# Patient Record
Sex: FEMALE | Race: WHITE | Employment: PART TIME | URBAN - METROPOLITAN AREA
[De-identification: names, ages, dates, MRNs, and addresses within clinical notes are randomized per-mention and may not be internally consistent; named-entity substitution may affect disease eponyms.]

---

## 2020-11-04 ENCOUNTER — OFFICE VISIT (OUTPATIENT)
Dept: SURGERY | Age: 40
End: 2020-11-04
Payer: OTHER GOVERNMENT

## 2020-11-04 VITALS
TEMPERATURE: 98.6 F | DIASTOLIC BLOOD PRESSURE: 84 MMHG | HEART RATE: 77 BPM | WEIGHT: 160 LBS | SYSTOLIC BLOOD PRESSURE: 124 MMHG | RESPIRATION RATE: 18 BRPM | HEIGHT: 61 IN | OXYGEN SATURATION: 99 % | BODY MASS INDEX: 30.21 KG/M2

## 2020-11-04 DIAGNOSIS — E66.01 MORBID OBESITY (HCC): Primary | ICD-10-CM

## 2020-11-04 DIAGNOSIS — Z98.84 S/P GASTRIC BYPASS: ICD-10-CM

## 2020-11-04 PROCEDURE — 99203 OFFICE O/P NEW LOW 30 MIN: CPT | Performed by: NURSE PRACTITIONER

## 2020-11-04 RX ORDER — TRAZODONE HYDROCHLORIDE 50 MG/1
75 TABLET ORAL
COMMUNITY

## 2020-11-04 RX ORDER — TOPIRAMATE 100 MG/1
TABLET, FILM COATED ORAL 2 TIMES DAILY
COMMUNITY

## 2020-11-04 RX ORDER — INSULIN ASPART 100 [IU]/ML
INJECTION, SUSPENSION SUBCUTANEOUS
COMMUNITY
End: 2020-11-28

## 2020-11-04 RX ORDER — FLUOXETINE 20 MG/1
20 TABLET ORAL DAILY
COMMUNITY

## 2020-11-04 RX ORDER — VITAMIN B COMPLEX
2500 TABLET ORAL DAILY
COMMUNITY
End: 2021-01-29

## 2020-11-04 RX ORDER — ASPIRIN 325 MG
TABLET, DELAYED RELEASE (ENTERIC COATED) ORAL
COMMUNITY

## 2020-11-04 NOTE — PROGRESS NOTES
1. Have you been to the ER, urgent care clinic since your last visit? Hospitalized since your last visit? No    2. Have you seen or consulted any other health care providers outside of the [address] [place] [address] since your last visit? Include any pap smears or colon screening.  No

## 2020-11-04 NOTE — PROGRESS NOTES
HISTORY OF PRESENT ILLNESS  Logan Nunn is a 38 y.o. female with previous Gastric bypass surgery with Dr. Kev Ricci complicated by multiple strictures, dilation and \"candy cane syndrome. \"   She start weight was 283 lbs. Her lowest weight was 150 lbs. She ranges 155-160 lbs. Body mass index is 30.23 kg/m². Durga Valencia no nausea and no vomiting . Denies Acid reflux/heartburn. . Drinking  64 ounces of water daily. Tries to focus on protein intake daily. Durga Hires +BM's. Pt is walks for exercise. She takes a probiotic because \"everything goes right through me. \" She is taking gummy vitamins because any other vitamins. caused diarrhea. Her blood sugars have been high. She is on a insulin pump. She is having surgery on 11/18/2020 and attributes her higher sugars to pain. Dietary recall -    Breakfast- coffee  Lunch- salad with tuna  Dinner- chicken, steak and vegetables    She is snacking between meals; variety but not much. She has recently started adding a shake. .          Ms. Bacon has a reminder for a \"due or due soon\" health maintenance. I have asked that she contact her primary care provider for follow-up on this health maintenance. COMORBIDITY     SLEEP APNEA                 NO        GERD  (req.meds)            NO  HYPERLIPIDEMIA            NO  HYPERTENSION              NO         DIABETES                         YES           Current Outpatient Medications:     insulin aspart protamine/insulin aspart (NovoLOG Mix 70-30 U-100 Insuln) 100 unit/mL (70-30) injection, by SubCUTAneous route., Disp: , Rfl:     traZODone (DESYREL) 50 mg tablet, Take  by mouth nightly., Disp: , Rfl:     FLUoxetine (PROzac) 20 mg tablet, Take 20 mg by mouth daily. , Disp: , Rfl:     topiramate (Topamax) 100 mg tablet, Take  by mouth two (2) times a day., Disp: , Rfl:     cyanocobalamin (Vitamin B-12) 2,500 mcg sublingual tablet, Take 2,500 mcg by mouth daily. , Disp: , Rfl:       Visit Vitals  /84 (BP 1 Location: Right arm, BP Patient Position: Sitting)   Pulse 77   Temp 98.6 °F (37 °C) (Oral)   Resp 18   Wt 160 lb (72.6 kg)     HPI    Review of Systems   Respiratory: Negative for shortness of breath. Cardiovascular: Negative for chest pain. Gastrointestinal: Negative for abdominal pain, heartburn, nausea and vomiting. Neurological: Positive for headaches (migraines). Negative for dizziness. Physical Exam  Constitutional:       Appearance: She is well-developed. Cardiovascular:      Rate and Rhythm: Normal rate and regular rhythm. Heart sounds: No murmur. No friction rub. No gallop. Pulmonary:      Effort: Pulmonary effort is normal.      Breath sounds: Normal breath sounds. Abdominal:      General: Bowel sounds are normal. There is no distension. Palpations: Abdomen is soft. Tenderness: There is no abdominal tenderness. Comments: No masses or hernias noted. Skin:     General: Skin is warm and dry. Neurological:      Mental Status: She is alert and oriented to person, place, and time. ASSESSMENT and PLAN  Kelly Cuevas is a 38 y.o. female with previous Gastric bypass surgery with Dr. Milo Terrazas complicated by multiple strictures, dilation and \"candy cane syndrome. \"   She start weight was 283 lbs. Her lowest weight was 150 lbs. She ranges 155-160 lbs. Body mass index is 30.23 kg/m². Iqra Ravenel no nausea and no vomiting . Denies Acid reflux/heartburn. . Drinking  64 ounces of water daily. Tries to focus on protein intake daily. Iqra Ravenel +BM's. Pt is walks for exercise. She takes a probiotic because \"everything goes right through me. \" She is taking gummy vitamins because any other vitamins. New bariatric book given. We dicussed vitamins especially the need for Iron. Possibly add Vitron C. Meet with RD  Follow up in 3 weeks. Advised patient regard to diet that is high-protein, low-fat, low-sugar, limited carbohydrates. Strive for 50-60 grams of protein daily. If having a snack, foods that are protein or fiber rich. . No eating/drinking together, chew foods well, and portion control. Measure meals. Discussed snacking behavior and to [facility] pay attention to behavioral factor and habits. Drink at least 40-64 ounces of water or non-calorie/non-carbonated beverages daily. Continue vitamin regiment daily. Exercise at least 3 days a week with cardiovascular and strength training. Patient to follow up in 3 months. Advised to call office if any questions/concerns. 30 Minutes spent face to face with patient, >50 % of time spent counseling.

## 2020-11-05 NOTE — PATIENT INSTRUCTIONS
Eating Healthy Foods: Care Instructions Your Care Instructions Eating healthy foods can help lower your risk for disease. Healthy food gives you energy and keeps your heart strong, your brain active, your muscles working, and your bones strong. A healthy diet includes a variety of foods from the basic food groups: grains, vegetables, fruits, milk and milk products, and meat and beans. Some people may eat more of their favorite foods from only one food group and, as a result, miss getting the nutrients they need. So, it is important to pay attention not only to what you eat but also to what you are missing from your diet. You can eat a healthy, balanced diet by making a few small changes. Follow-up care is a key part of your treatment and safety. Be sure to make and go to all appointments, and call your doctor if you are having problems. It's also a good idea to know your test results and keep a list of the medicines you take. How can you care for yourself at home? Look at what you eat · Keep a food diary for a week or two and record everything you eat or drink. Track the number of servings you eat from each food group. · For a balanced diet every day, eat a variety of: 
? 6 or more ounce-equivalents of grains, such as cereals, breads, crackers, rice, or pasta, every day. An ounce-equivalent is 1 slice of bread, 1 cup of ready-to-eat cereal, or ½ cup of cooked rice, cooked pasta, or cooked cereal. 
? 2½ cups of vegetables, especially: § Dark-green vegetables such as broccoli and spinach. § Orange vegetables such as carrots and sweet potatoes. § Dry beans (such as hanks and kidney beans) and peas (such as lentils). ? 2 cups of fresh, frozen, or canned fruit. A small apple or 1 banana or orange equals 1 cup. ? 3 cups of nonfat or low-fat milk, yogurt, or other milk products.  
? 5½ ounces of meat and beans, such as chicken, fish, lean meat, beans, nuts, and seeds. One egg, 1 tablespoon of peanut butter, ½ ounce nuts or seeds, or ¼ cup of cooked beans equals 1 ounce of meat. · Learn how to read food labels for serving sizes and ingredients. Fast-food and convenience-food meals often contain few or no fruits or vegetables. Make sure you eat some fruits and vegetables to make the meal more nutritious. · Look at your food diary. For each food group, add up what you have eaten and then divide the total by the number of days. This will give you an idea of how much you are eating from each food group. See if you can find some ways to change your diet to make it more healthy. Start small · Do not try to make dramatic changes to your diet all at once. You might feel that you are missing out on your favorite foods and then be more likely to fail. · Start slowly, and gradually change your habits. Try some of the following: ? Use whole wheat bread instead of white bread. ? Use nonfat or low-fat milk instead of whole milk. ? Eat brown rice instead of white rice, and eat whole wheat pasta instead of white-flour pasta. ? Try low-fat cheeses and low-fat yogurt. ? Add more fruits and vegetables to meals and have them for snacks. ? Add lettuce, tomato, cucumber, and onion to sandwiches. ? Add fruit to yogurt and cereal. 
Enjoy food · You can still eat your favorite foods. You just may need to eat less of them. If your favorite foods are high in fat, salt, and sugar, limit how often you eat them, but do not cut them out entirely. · Eat a wide variety of foods. Make healthy choices when eating out · The type of restaurant you choose can help you make healthy choices. Even fast-food chains are now offering more low-fat or healthier choices on the menu. · Choose smaller portions, or take half of your meal home. · When eating out, try: ? A veggie pizza with a whole wheat crust or grilled chicken (instead of sausage or pepperoni). ? Pasta with roasted vegetables, grilled chicken, or marinara sauce instead of cream sauce. ? A vegetable wrap or grilled chicken wrap. ? Broiled or poached food instead of fried or breaded items. Make healthy choices easy · Buy packaged, prewashed, ready-to-eat fresh vegetables and fruits, such as baby carrots, salad mixes, and chopped or shredded broccoli and cauliflower. · Buy packaged, presliced fruits, such as melon or pineapple. · Choose 100% fruit or vegetable juice instead of soda. Limit juice intake to 4 to 6 oz (½ to ¾ cup) a day. · Blend low-fat yogurt, fruit juice, and canned or frozen fruit to make a smoothie for breakfast or a snack. Where can you learn more? Go to http://www.verdugo.com/ Enter T756 in the search box to learn more about \"Eating Healthy Foods: Care Instructions. \" Current as of: August 22, 2019               Content Version: 12.6 © 6263-7385 [employer]. Care instructions adapted under license by [employer] (which disclaims liability or warranty for this information). If you have questions about a medical condition or this instruction, always ask your healthcare professional. [address] any warranty or liability for your use of this information.

## 2020-11-26 ENCOUNTER — APPOINTMENT (OUTPATIENT)
Dept: GENERAL RADIOLOGY | Age: 40
DRG: 638 | End: 2020-11-26
Attending: INTERNAL MEDICINE
Payer: OTHER GOVERNMENT

## 2020-11-26 ENCOUNTER — APPOINTMENT (OUTPATIENT)
Dept: CT IMAGING | Age: 40
DRG: 638 | End: 2020-11-26
Attending: FAMILY MEDICINE
Payer: OTHER GOVERNMENT

## 2020-11-26 ENCOUNTER — HOSPITAL ENCOUNTER (INPATIENT)
Age: 40
LOS: 1 days | Discharge: HOME | DRG: 638 | End: 2020-11-28
Attending: EMERGENCY MEDICINE | Admitting: INTERNAL MEDICINE
Payer: OTHER GOVERNMENT

## 2020-11-26 ENCOUNTER — APPOINTMENT (OUTPATIENT)
Dept: GENERAL RADIOLOGY | Age: 40
DRG: 638 | End: 2020-11-26
Attending: EMERGENCY MEDICINE
Payer: OTHER GOVERNMENT

## 2020-11-26 DIAGNOSIS — E10.10 DIABETIC KETOACIDOSIS WITHOUT COMA ASSOCIATED WITH TYPE 1 DIABETES MELLITUS (HCC): Primary | ICD-10-CM

## 2020-11-26 PROBLEM — F41.9 ANXIETY: Chronic | Status: ACTIVE | Noted: 2020-11-26

## 2020-11-26 PROBLEM — G43.909 MIGRAINE: Chronic | Status: ACTIVE | Noted: 2020-11-26

## 2020-11-26 PROBLEM — E10.9 DM (DIABETES MELLITUS), TYPE 1 (HCC): Chronic | Status: ACTIVE | Noted: 2020-11-26

## 2020-11-26 PROBLEM — E11.10 DKA (DIABETIC KETOACIDOSES): Status: ACTIVE | Noted: 2020-11-26

## 2020-11-26 PROBLEM — E55.9 VITAMIN D DEFICIENCY: Chronic | Status: ACTIVE | Noted: 2020-11-26

## 2020-11-26 LAB
ALBUMIN SERPL-MCNC: 4 G/DL (ref 3.5–5)
ALBUMIN/GLOB SERPL: 1 {RATIO} (ref 1.1–2.2)
ALP SERPL-CCNC: 132 U/L (ref 45–117)
ALT SERPL-CCNC: 42 U/L (ref 12–78)
ANION GAP SERPL CALC-SCNC: 15 MMOL/L (ref 5–15)
ANION GAP SERPL CALC-SCNC: 16 MMOL/L (ref 5–15)
ANION GAP SERPL CALC-SCNC: 17 MMOL/L (ref 5–15)
ANION GAP SERPL CALC-SCNC: 9 MMOL/L (ref 5–15)
APPEARANCE UR: CLEAR
AST SERPL W P-5'-P-CCNC: 31 U/L (ref 15–37)
BACTERIA URNS QL MICRO: NEGATIVE /HPF
BASE DEFICIT BLDV-SCNC: 19.2 MMOL/L (ref 0–2)
BASOPHILS NFR BLD: 0 % (ref 0–1)
BDY SITE: ABNORMAL
BILIRUB SERPL-MCNC: 0.6 MG/DL (ref 0.2–1)
BILIRUB UR QL: NEGATIVE
BUN SERPL-MCNC: 10 MG/DL (ref 6–20)
BUN SERPL-MCNC: 11 MG/DL (ref 6–20)
BUN SERPL-MCNC: 11 MG/DL (ref 6–20)
BUN SERPL-MCNC: 13 MG/DL (ref 6–20)
BUN/CREAT SERPL: 14 (ref 12–20)
BUN/CREAT SERPL: 14 (ref 12–20)
BUN/CREAT SERPL: 16 (ref 12–20)
BUN/CREAT SERPL: 18 (ref 12–20)
CA-I BLD-MCNC: 8 MG/DL (ref 8.5–10.1)
CA-I BLD-MCNC: 8.1 MG/DL (ref 8.5–10.1)
CA-I BLD-MCNC: 8.6 MG/DL (ref 8.5–10.1)
CA-I BLD-MCNC: 8.9 MG/DL (ref 8.5–10.1)
CHLORIDE SERPL-SCNC: 105 MMOL/L (ref 97–108)
CHLORIDE SERPL-SCNC: 114 MMOL/L (ref 97–108)
CHLORIDE SERPL-SCNC: 115 MMOL/L (ref 97–108)
CHLORIDE SERPL-SCNC: 115 MMOL/L (ref 97–108)
CO2 SERPL-SCNC: 10 MMOL/L (ref 21–32)
CO2 SERPL-SCNC: 13 MMOL/L (ref 21–32)
CO2 SERPL-SCNC: 15 MMOL/L (ref 21–32)
CO2 SERPL-SCNC: 9 MMOL/L (ref 21–32)
COLOR UR: ABNORMAL
CREAT SERPL-MCNC: 0.62 MG/DL (ref 0.55–1.02)
CREAT SERPL-MCNC: 0.64 MG/DL (ref 0.55–1.02)
CREAT SERPL-MCNC: 0.79 MG/DL (ref 0.55–1.02)
CREAT SERPL-MCNC: 0.93 MG/DL (ref 0.55–1.02)
D DIMER PPP FEU-MCNC: 1.53 UG/ML(FEU)
DIFFERENTIAL METHOD BLD: ABNORMAL
EOSINOPHIL # BLD: 0 K/UL (ref 0–0.4)
EOSINOPHIL NFR BLD: 0 % (ref 0–7)
ERYTHROCYTE [DISTWIDTH] IN BLOOD BY AUTOMATED COUNT: 15.5 % (ref 11.5–14.5)
FIO2 ON VENT: 21 %
GLOBULIN SER CALC-MCNC: 4.1 G/DL (ref 2–4)
GLUCOSE BLD STRIP.AUTO-MCNC: 111 MG/DL (ref 65–100)
GLUCOSE BLD STRIP.AUTO-MCNC: 126 MG/DL (ref 65–100)
GLUCOSE BLD STRIP.AUTO-MCNC: 344 MG/DL (ref 65–100)
GLUCOSE BLD STRIP.AUTO-MCNC: 356 MG/DL (ref 65–100)
GLUCOSE BLD STRIP.AUTO-MCNC: 357 MG/DL (ref 65–100)
GLUCOSE BLD STRIP.AUTO-MCNC: 438 MG/DL (ref 65–100)
GLUCOSE BLD STRIP.AUTO-MCNC: 65 MG/DL (ref 65–100)
GLUCOSE BLD STRIP.AUTO-MCNC: 83 MG/DL (ref 65–100)
GLUCOSE SERPL-MCNC: 182 MG/DL (ref 65–100)
GLUCOSE SERPL-MCNC: 292 MG/DL (ref 65–100)
GLUCOSE SERPL-MCNC: 292 MG/DL (ref 65–100)
GLUCOSE SERPL-MCNC: 335 MG/DL (ref 65–100)
GLUCOSE UR STRIP.AUTO-MCNC: >300 MG/DL
HCG SERPL QL: NEGATIVE
HCO3 BLDV-SCNC: 11 MMOL/L (ref 22–26)
HCT VFR BLD AUTO: 38.5 % (ref 35–47)
HGB BLD-MCNC: 12.3 G/DL (ref 11.5–16)
HGB UR QL STRIP: NEGATIVE
IMM GRANULOCYTES # BLD AUTO: 0 K/UL (ref 0–0.04)
IMM GRANULOCYTES NFR BLD AUTO: 0 % (ref 0–0.5)
KETONES UR QL STRIP.AUTO: 80 MG/DL
LACTATE SERPL-SCNC: 1.4 MMOL/L (ref 0.4–2)
LEUKOCYTE ESTERASE UR QL STRIP.AUTO: NEGATIVE
LYMPHOCYTES NFR BLD: 8 % (ref 12–49)
MAGNESIUM SERPL-MCNC: 1.6 MG/DL (ref 1.6–2.4)
MAGNESIUM SERPL-MCNC: 2.2 MG/DL (ref 1.6–2.4)
MCH RBC QN AUTO: 28.9 PG (ref 26–34)
MCHC RBC AUTO-ENTMCNC: 31.9 G/DL (ref 30–36.5)
MCV RBC AUTO: 90.6 FL (ref 80–99)
MONOCYTES # BLD: 0.3 K/UL (ref 0–1)
MONOCYTES NFR BLD: 2 % (ref 5–13)
MUCOUS THREADS URNS QL MICRO: ABNORMAL /LPF
NEUTS SEG NFR BLD: 90 % (ref 32–75)
NITRITE UR QL STRIP.AUTO: NEGATIVE
PCO2 BLDV: 33.9 MMHG (ref 40–50)
PERFORMED BY, TECHID: ABNORMAL
PERFORMED BY, TECHID: NORMAL
PERFORMED BY, TECHID: NORMAL
PH BLDV: 7.07 [PH] (ref 7.31–7.41)
PH UR STRIP: 5 [PH] (ref 5–8)
PHOSPHATE SERPL-MCNC: 2.3 MG/DL (ref 2.6–4.7)
PLATELET # BLD AUTO: 255 K/UL (ref 150–400)
PMV BLD AUTO: 12.8 FL (ref 8.9–12.9)
PO2 BLDV: 71 MMHG (ref 36–42)
POTASSIUM SERPL-SCNC: 3.9 MMOL/L (ref 3.5–5.1)
POTASSIUM SERPL-SCNC: 4 MMOL/L (ref 3.5–5.1)
POTASSIUM SERPL-SCNC: 4.1 MMOL/L (ref 3.5–5.1)
POTASSIUM SERPL-SCNC: 4.3 MMOL/L (ref 3.5–5.1)
PROCALCITONIN SERPL-MCNC: <0.05 NG/ML
PROT SERPL-MCNC: 8.1 G/DL (ref 6.4–8.2)
PROT UR STRIP-MCNC: NEGATIVE MG/DL
RBC # BLD AUTO: 4.25 M/UL (ref 3.8–5.2)
RBC #/AREA URNS HPF: ABNORMAL /HPF (ref 0–5)
SAO2 % BLDV: 89 % (ref 60–80)
SAO2% DEVICE SAO2% SENSOR NAME: ABNORMAL
SODIUM SERPL-SCNC: 135 MMOL/L (ref 136–145)
SODIUM SERPL-SCNC: 138 MMOL/L (ref 136–145)
SODIUM SERPL-SCNC: 140 MMOL/L (ref 136–145)
SODIUM SERPL-SCNC: 140 MMOL/L (ref 136–145)
SP GR UR REFRACTOMETRY: 1.02 (ref 1–1.03)
UA: UC IF INDICATED,UAUC: ABNORMAL
UROBILINOGEN UR QL STRIP.AUTO: 0.1 EU/DL (ref 0.1–1)
WBC # BLD AUTO: 13 K/UL (ref 3.6–11)
WBC URNS QL MICRO: ABNORMAL /HPF (ref 0–4)

## 2020-11-26 PROCEDURE — 87641 MR-STAPH DNA AMP PROBE: CPT

## 2020-11-26 PROCEDURE — 84145 PROCALCITONIN (PCT): CPT

## 2020-11-26 PROCEDURE — 71045 X-RAY EXAM CHEST 1 VIEW: CPT

## 2020-11-26 PROCEDURE — 96376 TX/PRO/DX INJ SAME DRUG ADON: CPT

## 2020-11-26 PROCEDURE — 83735 ASSAY OF MAGNESIUM: CPT

## 2020-11-26 PROCEDURE — 83036 HEMOGLOBIN GLYCOSYLATED A1C: CPT

## 2020-11-26 PROCEDURE — 36415 COLL VENOUS BLD VENIPUNCTURE: CPT

## 2020-11-26 PROCEDURE — 74011000250 HC RX REV CODE- 250: Performed by: EMERGENCY MEDICINE

## 2020-11-26 PROCEDURE — 81001 URINALYSIS AUTO W/SCOPE: CPT

## 2020-11-26 PROCEDURE — 96365 THER/PROPH/DIAG IV INF INIT: CPT

## 2020-11-26 PROCEDURE — 87040 BLOOD CULTURE FOR BACTERIA: CPT

## 2020-11-26 PROCEDURE — 96375 TX/PRO/DX INJ NEW DRUG ADDON: CPT

## 2020-11-26 PROCEDURE — 80053 COMPREHEN METABOLIC PANEL: CPT

## 2020-11-26 PROCEDURE — 96374 THER/PROPH/DIAG INJ IV PUSH: CPT

## 2020-11-26 PROCEDURE — 96366 THER/PROPH/DIAG IV INF ADDON: CPT

## 2020-11-26 PROCEDURE — 74011000258 HC RX REV CODE- 258: Performed by: EMERGENCY MEDICINE

## 2020-11-26 PROCEDURE — 73030 X-RAY EXAM OF SHOULDER: CPT

## 2020-11-26 PROCEDURE — 74011250636 HC RX REV CODE- 250/636: Performed by: INTERNAL MEDICINE

## 2020-11-26 PROCEDURE — 82010 KETONE BODYS QUAN: CPT

## 2020-11-26 PROCEDURE — 82962 GLUCOSE BLOOD TEST: CPT

## 2020-11-26 PROCEDURE — 93005 ELECTROCARDIOGRAM TRACING: CPT

## 2020-11-26 PROCEDURE — 85379 FIBRIN DEGRADATION QUANT: CPT

## 2020-11-26 PROCEDURE — 84703 CHORIONIC GONADOTROPIN ASSAY: CPT

## 2020-11-26 PROCEDURE — 74011000636 HC RX REV CODE- 636: Performed by: INTERNAL MEDICINE

## 2020-11-26 PROCEDURE — 84100 ASSAY OF PHOSPHORUS: CPT

## 2020-11-26 PROCEDURE — 83605 ASSAY OF LACTIC ACID: CPT

## 2020-11-26 PROCEDURE — 71275 CT ANGIOGRAPHY CHEST: CPT

## 2020-11-26 PROCEDURE — 99285 EMERGENCY DEPT VISIT HI MDM: CPT

## 2020-11-26 PROCEDURE — 82803 BLOOD GASES ANY COMBINATION: CPT

## 2020-11-26 PROCEDURE — 96372 THER/PROPH/DIAG INJ SC/IM: CPT

## 2020-11-26 PROCEDURE — 74011250636 HC RX REV CODE- 250/636: Performed by: EMERGENCY MEDICINE

## 2020-11-26 PROCEDURE — 85025 COMPLETE CBC W/AUTO DIFF WBC: CPT

## 2020-11-26 PROCEDURE — 74011636637 HC RX REV CODE- 636/637: Performed by: EMERGENCY MEDICINE

## 2020-11-26 PROCEDURE — 80048 BASIC METABOLIC PNL TOTAL CA: CPT

## 2020-11-26 RX ORDER — ONDANSETRON 4 MG/1
4 TABLET, ORALLY DISINTEGRATING ORAL
Status: DISCONTINUED | OUTPATIENT
Start: 2020-11-26 | End: 2020-11-28 | Stop reason: HOSPADM

## 2020-11-26 RX ORDER — ONDANSETRON 2 MG/ML
4 INJECTION INTRAMUSCULAR; INTRAVENOUS
Status: DISCONTINUED | OUTPATIENT
Start: 2020-11-26 | End: 2020-11-28 | Stop reason: HOSPADM

## 2020-11-26 RX ORDER — ACETAMINOPHEN 650 MG/1
650 SUPPOSITORY RECTAL
Status: DISCONTINUED | OUTPATIENT
Start: 2020-11-26 | End: 2020-11-28 | Stop reason: HOSPADM

## 2020-11-26 RX ORDER — ENOXAPARIN SODIUM 100 MG/ML
40 INJECTION SUBCUTANEOUS EVERY 24 HOURS
Status: DISCONTINUED | OUTPATIENT
Start: 2020-11-26 | End: 2020-11-28 | Stop reason: HOSPADM

## 2020-11-26 RX ORDER — DEXTROSE, SODIUM CHLORIDE, AND POTASSIUM CHLORIDE 5; .9; .15 G/100ML; G/100ML; G/100ML
50 INJECTION INTRAVENOUS CONTINUOUS
Status: DISCONTINUED | OUTPATIENT
Start: 2020-11-26 | End: 2020-11-28

## 2020-11-26 RX ORDER — ACETAMINOPHEN 325 MG/1
650 TABLET ORAL
Status: DISCONTINUED | OUTPATIENT
Start: 2020-11-26 | End: 2020-11-28 | Stop reason: HOSPADM

## 2020-11-26 RX ORDER — DEXTROSE 50 % IN WATER (D50W) INTRAVENOUS SYRINGE
25 AS NEEDED
Status: DISCONTINUED | OUTPATIENT
Start: 2020-11-26 | End: 2020-11-28 | Stop reason: HOSPADM

## 2020-11-26 RX ORDER — ADHESIVE BANDAGE
30 BANDAGE TOPICAL DAILY PRN
Status: DISCONTINUED | OUTPATIENT
Start: 2020-11-26 | End: 2020-11-28 | Stop reason: HOSPADM

## 2020-11-26 RX ORDER — SODIUM CHLORIDE 9 MG/ML
150 INJECTION, SOLUTION INTRAVENOUS CONTINUOUS
Status: DISCONTINUED | OUTPATIENT
Start: 2020-11-26 | End: 2020-11-27

## 2020-11-26 RX ADMIN — INSULIN HUMAN 7 UNITS: 100 INJECTION, SOLUTION PARENTERAL at 13:07

## 2020-11-26 RX ADMIN — SODIUM CHLORIDE 150 ML/HR: 9 INJECTION, SOLUTION INTRAVENOUS at 16:42

## 2020-11-26 RX ADMIN — DEXTROSE MONOHYDRATE 12.5 G: 25 INJECTION, SOLUTION INTRAVENOUS at 16:46

## 2020-11-26 RX ADMIN — SODIUM CHLORIDE 3.5 UNITS/HR: 0.9 INJECTION INTRAVENOUS at 13:57

## 2020-11-26 RX ADMIN — VANCOMYCIN HYDROCHLORIDE 750 MG: 750 INJECTION, POWDER, LYOPHILIZED, FOR SOLUTION INTRAVENOUS at 16:41

## 2020-11-26 RX ADMIN — IOPAMIDOL 100 ML: 755 INJECTION, SOLUTION INTRAVENOUS at 21:03

## 2020-11-26 RX ADMIN — ENOXAPARIN SODIUM 40 MG: 40 INJECTION SUBCUTANEOUS at 16:43

## 2020-11-26 RX ADMIN — ONDANSETRON 4 MG: 2 INJECTION INTRAMUSCULAR; INTRAVENOUS at 20:42

## 2020-11-26 RX ADMIN — SODIUM CHLORIDE 1000 ML: 9 INJECTION, SOLUTION INTRAVENOUS at 12:45

## 2020-11-26 RX ADMIN — CEFTRIAXONE 1 G: 1 INJECTION, POWDER, FOR SOLUTION INTRAMUSCULAR; INTRAVENOUS at 13:57

## 2020-11-26 RX ADMIN — SODIUM CHLORIDE 2.7 UNITS/HR: 0.9 INJECTION INTRAVENOUS at 22:20

## 2020-11-26 RX ADMIN — SODIUM CHLORIDE 1000 ML: 9 INJECTION, SOLUTION INTRAVENOUS at 11:18

## 2020-11-26 NOTE — PROGRESS NOTES
Vancomycin Note-11/26/2020    Admission date 137522   Attending Cornell Liz   Indication  Sepsis + possible post op infection       Height Ht Readings from Last 1 Encounters:   11/26/20 154.9 cm (61\")       Weight Wt Readings from Last 1 Encounters:   11/26/20 68.5 kg (151 lb)      IBW ideal body weight      SCr Creatinine   Date Value Ref Range Status   11/26/2020 0.79 0.55 - 1.02 mg/dL Final   11/26/2020 0.93 0.55 - 1.02 mg/dL Final      CrCl (based on IBW) 70.7 ml/min       Load/ER dose 750mg given 11/26   Current regimen  New start   Calculated peak / trough 28 / 16   NEW regimen 750mg q8hr   Level Scheduled for 11/27 @ 1500         Current Antimicrobial Therapy (168h ago, onward)      Ordered     Start Stop    11/26/20 1518  cefTRIAXone (ROCEPHIN) 1 g in 0.9% sodium chloride (MBP/ADV) 50 mL MBP  1 g,   IntraVENous,   EVERY 24 HOURS      11/27/20 1400 --    11/26/20 1719  vancomycin (VANCOCIN) 750 mg in 0.9% sodium chloride 250 mL (VIAL-MATE)  750 mg,   IntraVENous,   EVERY 8 HOURS      11/27/20 0100 --    11/26/20 1525  Vancomycin dose per pharmacy protocol  Other,   RX DOSING/MONITORING      11/26/20 1525 --    11/26/20 1518  vancomycin (VANCOCIN) 750 mg in 0.9% sodium chloride 250 mL (VIAL-MATE)  750 mg,   IntraVENous,   ONCE      11/26/20 1519 11/27/20 0318            Submitted by:  Lorelei Edwards, PHARMD

## 2020-11-26 NOTE — ED TRIAGE NOTES
[facility] pt stated that around 3 am she woke up vomiting; pt stated that she checked her ketones and it was 3.2; c/o n/v, diarrhea, and ABD pain; Blood sugar 356 in triage

## 2020-11-26 NOTE — H&P
History & Physical    Primary Care Provider: Ajay Wong MD  Source of Information: Patient/, ed physician, records    History of Presenting Illness:   Rolo Mayberry is a 38 y.o. female who presents with history of type 1 diabetes on an insulin pump. She also had arthroscopic left shoulder surgery for frozen shoulder by Dr. Short at [address] last week. She states that she woke up at 3 AM feeling nauseous, subsequently vomited and had some diarrhea which incidentally is not uncommon for her since she had bariatric surgery remotely though states has not vomited since had her gallbladder out in 2018. No hematemesis. No blood in her stool. She went back to bed and nausea persisted. She checked her blood sugars and they were elevated around 400 which is very uncommon for her. She states that since she had bariatric surgery she went from over 100 units of insulin a day to now well controlled with 30 to 35 units a day on her pump. Here in the emergency room she is found to be in DKA. VBG shows a pH of 7.07, anion gap is 17, renal function is preserved and her white count is 13. She does feel some chills but states this is not uncommon for her. She still has some soreness to her left shoulder and there is some mild redness associated. She states that she has had DKA in the past 4 times and each time it was associated with an infection. She does deny any sore throat, cough, chest or sinus congestion, change in taste or exposure to Covid individuals. She is started on insulin pump here in the emergency room, hydrated aggressively per protocol and referred to us for admission secondary to her DKA. Review of Systems:  Review of Systems   Constitutional: Positive for malaise/fatigue. HENT: Negative. Negative for sore throat. Eyes: Negative. Respiratory: Positive for shortness of breath.  Negative for cough, hemoptysis, sputum production and wheezing. Cardiovascular: Negative. Gastrointestinal: Positive for diarrhea, nausea and vomiting. Vomiting started this morning at 3 AM.  Diarrhea has been chronic and intermittent and not uncommon for her after her bariatric surgery remotely. Genitourinary: Negative. Musculoskeletal: Negative. Recent arthroscopic left shoulder surgery, pain persists, recently started PT. Skin: Negative. Neurological: Positive for dizziness. Endo/Heme/Allergies: Negative. Psychiatric/Behavioral: Negative. Past Medical History:   Diagnosis Date    Anxiety     Autoimmune disease ([address]     Bloating     Depression     Diabetes (HCC)     Type I     Diarrhea     Fatigue     Headache     Kidney stones     Loss of appetite     Nausea       Past Surgical History:   Procedure Laterality Date    HX CHOLECYSTECTOMY  2018    Dr. Krysten Kessler  [address]    Dr. Meagan Alicia [facility])   [address], [address], [address]   Nia Chan GYN  2017    Uterine Ablation      Prior to Admission medications    Medication Sig Start Date End Date Taking? Authorizing Provider   insulin aspart protamine/insulin aspart (NovoLOG Mix 70-30 U-100 Insuln) 100 unit/mL (70-30) injection by SubCUTAneous route. Provider, Historical   traZODone (DESYREL) 50 mg tablet Take  by mouth nightly. Provider, Historical   FLUoxetine (PROzac) 20 mg tablet Take 20 mg by mouth daily. Provider, Historical   topiramate (Topamax) 100 mg tablet Take  by mouth two (2) times a day. Provider, Historical   cyanocobalamin (Vitamin B-12) 2,500 mcg sublingual tablet Take 2,500 mcg by mouth daily. Provider, Historical   cholecalciferol (VITAMIN D3) (50,000 UNITS /1250 MCG) capsule Take  by mouth every seven (7) days. Provider, Historical     Allergies   Allergen Reactions    Adhesive Tape-Silicones Contact Dermatitis     Erythema       No family history on file.      SOCIAL HISTORY:  Patient resides:  Independently x Assisted Living    SNF    With family care       Smoking history:   None x   Former    Chronic      Alcohol history:   None x   Social    Chronic      Ambulates:   Independently x   w/cane    w/walker    w/wc    CODE STATUS:  DNR    Full x   Other      Objective:     Physical Exam:     Visit Vitals  BP (!) 105/58 (BP 1 Location: Right arm, BP Patient Position: At rest;Sitting)   Pulse (!) 110   Temp 98.4 °F (36.9 °C)   Resp 18   Ht 5' 1\" (1.549 m)   Wt 68.5 kg (151 lb)   SpO2 100%   BMI 28.53 kg/m²      O2 Device: Room air    General:  Alert, cooperative, no distress, appears stated age. Head:  Normocephalic, without obvious abnormality, atraumatic. Eyes:  Conjunctivae/corneas clear. EOMs intact. Nose: Nares normal. Septum midline. Mucosa normal. No drainage or sinus tenderness. Throat: Lips, mucosa, and tongue normal. Teeth and gums normal.   Neck: Supple, symmetrical, trachea midline, no adenopathy, thyroid: no enlargement/tenderness/nodules, no carotid bruit and no JVD. Back:   Symmetric, no curvature. ROM normal. No CVA tenderness. Lungs:   Clear to auscultation bilaterally. Chest wall:  No tenderness or deformity. Heart:  Regular rate and rhythm, S1, S2 normal, no murmur, click, rub or gallop. Abdomen:   Soft, non-tender. Bowel sounds normal. No masses,  No organomegaly. Extremities:  Pain to range of motion left shoulder, there is warmth in comparison with the right shoulder, minimal erythema. Pulses: 2+ and symmetric all extremities. Skin: Skin color, texture, turgor normal. No rashes or lesions   Neurologic: CNII-XII intact. No motor or sensory deficits.           Data Review:     Recent Days:  Recent Labs     11/26/20  1158   WBC 13.0*   HGB 12.3   HCT 38.5        Recent Labs     11/26/20  1158   *   K 4.3      CO2 13*   *   BUN 13   CREA 0.93   CA 8.9   ALB 4.0   TBILI 0.6   ALT 42     Recent Labs     11/26/20  1158   FIO2 21.0       24 Hour Results:  Recent Results (from the past 24 hour(s))   GLUCOSE, POC    Collection Time: 11/26/20 11:21 AM   Result Value Ref Range    Glucose (POC) 356 (H) 65 - 100 mg/dL    Performed by Alex Baxter    URINALYSIS W/ REFLEX CULTURE    Collection Time: 11/26/20 11:45 AM    Specimen: Urine   Result Value Ref Range    Color Yellow/Straw      Appearance Clear Clear      Specific gravity 1.025 1.003 - 1.030      pH (UA) 5.0 5.0 - 8.0      Protein Negative Negative mg/dL    Glucose >300 (A) Negative mg/dL    Ketone 80 (A) Negative mg/dL    Bilirubin Negative Negative      Blood Negative Negative      Urobilinogen 0.1 0.1 - 1.0 EU/dL    Nitrites Negative Negative      Leukocyte Esterase Negative Negative      UA:UC IF INDICATED PENDING     WBC 0-4 0 - 4 /hpf    RBC 0-5 0 - 5 /hpf    Bacteria Negative Negative /hpf    Mucus Trace /lpf   CBC WITH AUTOMATED DIFF    Collection Time: 11/26/20 11:58 AM   Result Value Ref Range    WBC 13.0 (H) 3.6 - 11.0 K/uL    RBC 4.25 3.80 - 5.20 M/uL    HGB 12.3 11.5 - 16.0 g/dL    HCT 38.5 35.0 - 47.0 %    MCV 90.6 80.0 - 99.0 FL    MCH 28.9 26.0 - 34.0 PG    MCHC 31.9 30.0 - 36.5 g/dL    RDW 15.5 (H) 11.5 - 14.5 %    PLATELET 085 337 - 239 K/uL    MPV 12.8 8.9 - 12.9 FL    NEUTROPHILS 90 (H) 32 - 75 %    LYMPHOCYTES 8 (L) 12 - 49 %    MONOCYTES 2 (L) 5 - 13 %    EOSINOPHILS 0 0 - 7 %    BASOPHILS 0 0 - 1 %    IMMATURE GRANULOCYTES 0 0.0 - 0.5 %    ABS. MONOCYTES 0.3 0.0 - 1.0 K/UL    ABS. EOSINOPHILS 0.0 0.0 - 0.4 K/UL    ABS. IMM.  GRANS. 0.0 0.00 - 0.04 K/UL    DF AUTOMATED     METABOLIC PANEL, COMPREHENSIVE    Collection Time: 11/26/20 11:58 AM   Result Value Ref Range    Sodium 135 (L) 136 - 145 mmol/L    Potassium 4.3 3.5 - 5.1 mmol/L    Chloride 105 97 - 108 mmol/L    CO2 13 (LL) 21 - 32 mmol/L    Anion gap 17 (H) 5 - 15 mmol/L    Glucose 335 (H) 65 - 100 mg/dL    BUN 13 6 - 20 mg/dL    Creatinine 0.93 0.55 - 1.02 mg/dL    BUN/Creatinine ratio 14 12 - 20      GFR est AA >60 >60 ml/min/1.73m2    GFR est non-AA >60 >60 ml/min/1.73m2    Calcium 8.9 8.5 - 10.1 mg/dL    Bilirubin, total 0.6 0.2 - 1.0 mg/dL    AST (SGOT) 31 15 - 37 U/L    ALT (SGPT) 42 12 - 78 U/L    Alk. phosphatase 132 (H) 45 - 117 U/L    Protein, total 8.1 6.4 - 8.2 g/dL    Albumin 4.0 3.5 - 5.0 g/dL    Globulin 4.1 (H) 2.0 - 4.0 g/dL    A-G Ratio 1.0 (L) 1.1 - 2.2     VENOUS BLOOD GAS    Collection Time: 11/26/20 11:58 AM   Result Value Ref Range    VENOUS PH 7.073 (LL) 7.31 - 7.41      VENOUS PCO2 33.9 (L) 40 - 50 mmHg    VENOUS PO2 71 (H) 36 - 42 mmHg    VENOUS O2 SATURATION 89 (H) 60 - 80 %    VENOUS BICARBONATE 11 (L) 22 - 26 mmol/L    VENOUS BASE DEFICIT 19.2 (H) 0 - 2 mmol/L    O2 METHOD Room air      FIO2 21.0 %    SITE Right Radial     HCG QL SERUM    Collection Time: 11/26/20 11:58 AM   Result Value Ref Range    HCG, Ql. Negative Negative           Imaging:     Assessment:     Principal Problem:    DKA (diabetic ketoacidoses) ([facility] (11/26/2020)    Active Problems:    DM (diabetes mellitus), type 1 ([facility] (11/26/2020)      Migraine (11/26/2020)      Anxiety (11/26/2020)      Vitamin D deficiency (11/26/2020)         --Diabetes mellitus type 1 with ketoacidosis: He has had DKA 4 times in the past, last about 4 years ago and she says each time associated with infection. Like it all came on suddenly at 3 AM.  He is on insulin pump and she states her blood sugars have been very well controlled. --Dehydration  --SIRS versus sepsis: White count is 13,000, reactive is a possibility though recent left shoulder arthroscopic surgery is a concern and it is warm and pain tender. She had surgery she describes for frozen shoulder last week with Dr. Heriberto Aguilar through [facility] orthopedics  --Vitamin D deficiency  --History of migraines, none current  --Anxiety        Plan:     -She will be admitted inpatient status to the intensive care unit secondary to her DKA.   --DKA protocol, judicious hydration and insulin drip, follow serial BMPs.  --Remove insulin pump now, will resume once DKA resolves. --We will get orthopedics involved as there is concern about possible infection with that left shoulder and I will empirically start her on Zosyn and vancomycin. Blood cultures are requested. --Hydrocodone as needed for pain  --Home medications are reviewed with her, resumed. VTEP: Lovenox  GIP: Pepcid  Full CODE STATUS  Disposition: IP admission, dissipating at least 2 midnights here, ICU admission on DKA protocol, sepsis versus SIRS, Ortho to evaluate for possible left shoulder infection status post arthroscopic intervention last week. Anticipate she will go home with outpatient follow-up. Incidentally she is recently started twice weekly physical therapy. We will get PT OT involved.   Signed By: Roshan Thomason MD     November 26, 2020

## 2020-11-26 NOTE — ED PROVIDER NOTES
EMERGENCY DEPARTMENT HISTORY AND PHYSICAL EXAM      Date: 11/26/2020  Patient Name: Jonelle Caraballo    History of Presenting Illness     Chief Complaint   Patient presents with    High Blood Sugar       History Provided By: Patient and Patient's     HPI: Jonelle Caraballo, 38 y.o. female with a past medical history significant diabetes presents to the ED with chief complaint of High Blood Sugar  . 79-year-old female with a history of type 1 diabetes on an insulin pump. She has had DKA in the past but most recently was 4 years ago. She did recently have shoulder surgery on her left shoulder. She woke up last night feeling nausea vomiting diarrhea crampy abdominal pain. No cough or congestion. No fevers. Also short of breath. Body aches. Feels this way when she has DKA. Her sugar is elevated. She checked her ketones prior to arrival and they are elevated as well in the urine. There are no other complaints, changes, or physical findings at this time.     PCP: Xiao Mcneil MD    Current Facility-Administered Medications   Medication Dose Route Frequency Provider Last Rate Last Dose    sodium chloride 0.9 % bolus infusion 1,000 mL  1,000 mL IntraVENous Andre GOMEZ MD        insulin regular (NOVOLIN R, HUMULIN R) injection 7 Units  7 Units IntraVENous NOW Joe Dean MD        insulin regular (NOVOLIN R, HUMULIN R) 100 Units in 0.9% sodium chloride 100 mL infusion  7 Units/hr IntraVENous TITRATE Pettis, Walter Hatchet, MD        dextrose (D50W) injection syrg 12.5 g  25 mL IntraVENous PRN Pettis, Walter Hatchet, MD        sodium chloride 0.9 % bolus infusion 1,000 mL  1,000 mL IntraVENous ONCE Pettis, Walter Hatchet, MD        0.9% sodium chloride infusion  75 mL/hr IntraVENous CONTINUOUS Pettis, Walter Hatchet, MD        cefTRIAXone (ROCEPHIN) 1 g in 0.9% sodium chloride (MBP/ADV) 50 mL MBP  1 g IntraVENous NOW Pettis, Walter Hatchet, MD         Current Outpatient Medications   Medication Sig Dispense Refill    insulin aspart protamine/insulin aspart (NovoLOG Mix 70-30 U-100 Insuln) 100 unit/mL (70-30) injection by SubCUTAneous route.  traZODone (DESYREL) 50 mg tablet Take  by mouth nightly.  FLUoxetine (PROzac) 20 mg tablet Take 20 mg by mouth daily.  topiramate (Topamax) 100 mg tablet Take  by mouth two (2) times a day.  cyanocobalamin (Vitamin B-12) 2,500 mcg sublingual tablet Take 2,500 mcg by mouth daily.  cholecalciferol (VITAMIN D3) (50,000 UNITS /1250 MCG) capsule Take  by mouth every seven (7) days. Past History     Past Medical History:  Past Medical History:   Diagnosis Date    Anxiety     Autoimmune disease ([facility] [address]     Bloating     Depression     Diabetes ([facility] [address]     Type I     Diarrhea     Fatigue     Headache     Kidney stones     Loss of appetite     Nausea        Past Surgical History:  Past Surgical History:   Procedure Laterality Date    HX CHOLECYSTECTOMY  2018    Dr. Valerie Pandey  2048    Dr. Hafsa Diez [facility])   [address] [facility], 2000, 2005    HX GYN  2017    Uterine Ablation        Family History:  No family history on file. Social History:  Social History     Tobacco Use    Smoking status: Never Smoker    Smokeless tobacco: Never Used   Substance Use Topics    Alcohol use: Not Currently    Drug use: Never       Allergies: Allergies   Allergen Reactions    Adhesive Tape-Silicones Contact Dermatitis     Erythema          Review of Systems   Review of Systems   Constitutional: Positive for fatigue. Negative for chills and fever. HENT: Negative. Negative for congestion, nosebleeds and sore throat. Eyes: Negative. Negative for pain, discharge and visual disturbance. Respiratory: Positive for shortness of breath. Negative for cough and chest tightness. Cardiovascular: Negative for chest pain, palpitations and leg swelling. Gastrointestinal: Positive for diarrhea and vomiting.  Negative for abdominal pain, blood in stool, constipation and nausea. Endocrine: Negative. Genitourinary: Negative. Negative for difficulty urinating, dysuria, pelvic pain and vaginal bleeding. Musculoskeletal: Negative. Negative for arthralgias, back pain and myalgias. Skin: Negative. Negative for rash and wound. Allergic/Immunologic: Negative. Neurological: Positive for dizziness and weakness. Negative for syncope, numbness and headaches. Hematological: Negative. Psychiatric/Behavioral: Negative. Negative for agitation, confusion and suicidal ideas. All other systems reviewed and are negative. Physical Exam   Physical Exam  Vitals signs and nursing note reviewed. Exam conducted with a chaperone present. Constitutional:       General: She is in acute distress. Appearance: Normal appearance. She is normal weight. She is ill-appearing. HENT:      Head: Normocephalic and atraumatic. Nose: Nose normal.      Mouth/Throat:      Mouth: Mucous membranes are moist.      Pharynx: Oropharynx is clear. Eyes:      Extraocular Movements: Extraocular movements intact. Conjunctiva/sclera: Conjunctivae normal.      Pupils: Pupils are equal, round, and reactive to light. Neck:      Musculoskeletal: Normal range of motion and neck supple. Cardiovascular:      Rate and Rhythm: Normal rate and regular rhythm. Pulses: Normal pulses. Heart sounds: Normal heart sounds. Pulmonary:      Effort: Pulmonary effort is normal. No respiratory distress. Breath sounds: Normal breath sounds. Comments: Tachypnea. Kussmaul respiration  Abdominal:      General: Abdomen is flat. Bowel sounds are normal. There is no distension. Palpations: Abdomen is soft. Tenderness: There is no abdominal tenderness. There is no guarding. Musculoskeletal: Normal range of motion. General: No swelling, tenderness, deformity or signs of injury. Right lower leg: No edema.       Left lower leg: No edema.   Skin:     General: Skin is warm and dry. Capillary Refill: Capillary refill takes less than 2 seconds. Findings: No lesion or rash. Neurological:      General: No focal deficit present. Mental Status: She is alert and oriented to person, place, and time. Mental status is at baseline. Cranial Nerves: No cranial nerve deficit. Psychiatric:         Mood and Affect: Mood normal.         Behavior: Behavior normal.         Thought Content:  Thought content normal.         Judgment: Judgment normal.         Diagnostic Study Results     Labs -     Recent Results (from the past 12 hour(s))   GLUCOSE, POC    Collection Time: 11/26/20 11:21 AM   Result Value Ref Range    Glucose (POC) 356 (H) 65 - 100 mg/dL    Performed by [facility] W/ REFLEX CULTURE    Collection Time: 11/26/20 11:45 AM    Specimen: Urine   Result Value Ref Range    Color Yellow/Straw      Appearance Clear Clear      Specific gravity 1.025 1.003 - 1.030      pH (UA) 5.0 5.0 - 8.0      Protein Negative Negative mg/dL    Glucose >300 (A) Negative mg/dL    Ketone 80 (A) Negative mg/dL    Bilirubin Negative Negative      Blood Negative Negative      Urobilinogen 0.1 0.1 - 1.0 EU/dL    Nitrites Negative Negative      Leukocyte Esterase Negative Negative      UA:UC IF INDICATED PENDING     WBC 0-4 0 - 4 /hpf    RBC 0-5 0 - 5 /hpf    Bacteria Negative Negative /hpf    Mucus Trace /lpf   CBC WITH AUTOMATED DIFF    Collection Time: 11/26/20 11:58 AM   Result Value Ref Range    WBC 13.0 (H) 3.6 - 11.0 K/uL    RBC 4.25 3.80 - 5.20 M/uL    HGB 12.3 11.5 - 16.0 g/dL    HCT 38.5 35.0 - 47.0 %    MCV 90.6 80.0 - 99.0 FL    MCH 28.9 26.0 - 34.0 PG    MCHC 31.9 30.0 - 36.5 g/dL    RDW 15.5 (H) 11.5 - 14.5 %    PLATELET 172 493 - 016 K/uL    MPV 12.8 8.9 - 12.9 FL    NEUTROPHILS 90 (H) 32 - 75 %    LYMPHOCYTES 8 (L) 12 - 49 %    MONOCYTES 2 (L) 5 - 13 %    EOSINOPHILS 0 0 - 7 %    BASOPHILS 0 0 - 1 %    IMMATURE GRANULOCYTES 0 0.0 - 0.5 %    ABS. MONOCYTES 0.3 0.0 - 1.0 K/UL    ABS. EOSINOPHILS 0.0 0.0 - 0.4 K/UL    ABS. IMM. GRANS. 0.0 0.00 - 0.04 K/UL    DF AUTOMATED     METABOLIC PANEL, COMPREHENSIVE    Collection Time: 11/26/20 11:58 AM   Result Value Ref Range    Sodium 135 (L) 136 - 145 mmol/L    Potassium 4.3 3.5 - 5.1 mmol/L    Chloride 105 97 - 108 mmol/L    CO2 13 (LL) 21 - 32 mmol/L    Anion gap 17 (H) 5 - 15 mmol/L    Glucose 335 (H) 65 - 100 mg/dL    BUN 13 6 - 20 mg/dL    Creatinine 0.93 0.55 - 1.02 mg/dL    BUN/Creatinine ratio 14 12 - 20      GFR est AA >60 >60 ml/min/1.73m2    GFR est non-AA >60 >60 ml/min/1.73m2    Calcium 8.9 8.5 - 10.1 mg/dL    Bilirubin, total 0.6 0.2 - 1.0 mg/dL    AST (SGOT) 31 15 - 37 U/L    ALT (SGPT) 42 12 - 78 U/L    Alk. phosphatase 132 (H) 45 - 117 U/L    Protein, total 8.1 6.4 - 8.2 g/dL    Albumin 4.0 3.5 - 5.0 g/dL    Globulin 4.1 (H) 2.0 - 4.0 g/dL    A-G Ratio 1.0 (L) 1.1 - 2.2     VENOUS BLOOD GAS    Collection Time: 11/26/20 11:58 AM   Result Value Ref Range    VENOUS PH 7.073 (LL) 7.31 - 7.41      VENOUS PCO2 33.9 (L) 40 - 50 mmHg    VENOUS PO2 71 (H) 36 - 42 mmHg    VENOUS O2 SATURATION 89 (H) 60 - 80 %    VENOUS BICARBONATE 11 (L) 22 - 26 mmol/L    VENOUS BASE DEFICIT 19.2 (H) 0 - 2 mmol/L    O2 METHOD Room air      FIO2 21.0 %    SITE Right Radial     HCG QL SERUM    Collection Time: 11/26/20 11:58 AM   Result Value Ref Range    HCG, Ql. Negative Negative         Radiologic Studies -   XR SHOULDER LT AP/LAT MIN 2 V   Final Result   IMPRESSION:   1. No acute osseous abnormality of the left shoulder. CT Results  (Last 48 hours)    None        CXR Results  (Last 48 hours)    None          Medical Decision Making and ED Course   I am the first provider for this patient. I reviewed the vital signs, available nursing notes, past medical history, past surgical history, family history and social history. Vital Signs-Reviewed the patient's vital signs.   Patient Vitals for the past 12 hrs:   Temp Pulse Resp BP SpO2   11/26/20 1124 98.4 °F (36.9 °C) (!) 110 18 (!) 105/58 100 %       EKG interpretation:         Records Reviewed: Previous Hospital chart. EMS run report      ED Course:   Initial assessment performed. The patients presenting problems have been discussed, and they are in agreement with the care plan formulated and outlined with them. I have encouraged them to ask questions as they arise throughout their visit. Orders Placed This Encounter    CULTURE, BLOOD, PAIRED     Standing Status:   Standing     Number of Occurrences:   1    CULTURE, BLOOD     Standing Status:   Standing     Number of Occurrences:   1    XR SHOULDER LT AP/LAT MIN 2 V     Standing Status:   Standing     Number of Occurrences:   1     Order Specific Question:   Transport     Answer:   Ambulatory [1]     Order Specific Question:   Reason for Exam     Answer:   recent surbergy     Order Specific Question:   Is Patient Pregnant? Answer:   Unknown    CBC WITH AUTOMATED DIFF     Standing Status:   Standing     Number of Occurrences:   1    METABOLIC PANEL, COMPREHENSIVE     Standing Status:   Standing     Number of Occurrences:   1    BETA-HYDROXYBUTYRATE     Standing Status:   Standing     Number of Occurrences:   1    VENOUS BLOOD GAS     Standing Status:   Standing     Number of Occurrences:   1    URINALYSIS W/ REFLEX CULTURE     Standing Status:   Standing     Number of Occurrences:   1    HCG QL SERUM     Standing Status:   Standing     Number of Occurrences:   1    LACTIC ACID, PLASMA     If lactic acid level is greater than 2, then a repeat lactic acid level is to be drawn in 6 hours. Standing Status:   Standing     Number of Occurrences:   1    LACTIC ACID, PLASMA     If initial lactic acid level is greater than 2, then a repeat lactic acid level is to be drawn in 6 hours.      Standing Status:   Standing     Number of Occurrences:   03139    PROCALCITONIN     Standing Status: Standing     Number of Occurrences:   1    METABOLIC PANEL, BASIC     Standing Status:   Standing     Number of Occurrences:   80    POC GLUCOSE     Standing Status:   Standing     Number of Occurrences:   1    POC GLUCOSE     Standing Status:   Standing     Number of Occurrences:   1    POC GLUCOSE     Standing Status:   Standing     Number of Occurrences:   12544    POC GLUCOSE     Standing Status:   Standing     Number of Occurrences:   1    GLUCOSE, POC     Standing Status:   Standing     Number of Occurrences:   1    EKG 12 LEAD INITIAL     Standing Status:   Standing     Number of Occurrences:   1     Order Specific Question:   Reason for Exam:     Answer:   electrolyte    sodium chloride 0.9 % bolus infusion 1,000 mL    insulin regular (NOVOLIN R, HUMULIN R) injection 7 Units    insulin regular (NOVOLIN R, HUMULIN R) 100 Units in 0.9% sodium chloride 100 mL infusion    dextrose (D50W) injection syrg 12.5 g    sodium chloride 0.9 % bolus infusion 1,000 mL    0.9% sodium chloride infusion    cefTRIAXone (ROCEPHIN) 1 g in 0.9% sodium chloride (MBP/ADV) 50 mL MBP     Order Specific Question:   Antibiotic Indications     Answer:   Skin and Soft Tissue Infection              CONSULTANTS:  Consults  Dr. Valeria Wood hospitalist for admission. Provider Notes (Medical Decision Making):   76-year-old female with hyperglycemia. Differential includes DKA, elevated glucose. Sepsis. Dehydration. Patient's lab work and pH on the ABG does suggest DKA. IV fluids have been ongoing. Insulin drip initiated. Patient is alert and oriented with normal mentation now.   Plan to discuss the case with the hospitalist.      Procedures               CRITICAL CARE NOTE :  12:47 PM  Amount of Critical Care Time: 45 minutes    IMPENDING DETERIORATION -Airway, Respiratory, Cardiovascular and Metabolic  ASSOCIATED RISK FACTORS - Metabolic changes  MANAGEMENT- Bedside Assessment and Supervision of Care  INTERPRETATION - Blood Gases and Blood Pressure  INTERVENTIONS - hemodynamic mngmt and Metobolic interventions  CASE REVIEW - Hospitalist, Nursing and Family  TREATMENT RESPONSE -Improved  PERFORMED BY - Self    dka management    NOTES   :  I have spent critical care time involved in lab review, consultations with specialist, family decision- making, bedside attention and documentation. This time excludes time spent in any separate billed procedures. During this entire length of time I was immediately available to the patient . Cat Ellis MD            Disposition       Emergency Department Disposition:  Admitted      Diagnosis     Clinical Impression:   1. Diabetic ketoacidosis without coma associated with type 1 diabetes mellitus ([facility] [address]        Attestations:    Cat Ellis MD    Please note that this dictation was completed with "[email or web address] the computer voice recognition software. Quite often unanticipated grammatical, syntax, homophones, and other interpretive errors are inadvertently transcribed by the computer software. Please disregard these errors. Please excuse any errors that have escaped final proofreading. Thank you.

## 2020-11-26 NOTE — ED NOTES
Pt c/o that she feels like chest is heavy. .. Noted HR up slightly but bp remains stable. . Page place to Dr. Vane Martinez

## 2020-11-27 PROBLEM — M25.519 SHOULDER PAIN: Status: ACTIVE | Noted: 2020-11-27

## 2020-11-27 PROBLEM — R65.10 SIRS (SYSTEMIC INFLAMMATORY RESPONSE SYNDROME) (HCC): Status: ACTIVE | Noted: 2020-11-27

## 2020-11-27 LAB
ANION GAP SERPL CALC-SCNC: 10 MMOL/L (ref 5–15)
ANION GAP SERPL CALC-SCNC: 11 MMOL/L (ref 5–15)
ANION GAP SERPL CALC-SCNC: 11 MMOL/L (ref 5–15)
ANION GAP SERPL CALC-SCNC: 7 MMOL/L (ref 5–15)
ANION GAP SERPL CALC-SCNC: 9 MMOL/L (ref 5–15)
ATRIAL RATE: 94 BPM
BASOPHILS # BLD: 0 K/UL (ref 0–0.1)
BASOPHILS NFR BLD: 0 % (ref 0–1)
BUN SERPL-MCNC: 6 MG/DL (ref 6–20)
BUN SERPL-MCNC: 6 MG/DL (ref 6–20)
BUN SERPL-MCNC: 7 MG/DL (ref 6–20)
BUN SERPL-MCNC: 8 MG/DL (ref 6–20)
BUN SERPL-MCNC: 9 MG/DL (ref 6–20)
BUN/CREAT SERPL: 10 (ref 12–20)
BUN/CREAT SERPL: 10 (ref 12–20)
BUN/CREAT SERPL: 11 (ref 12–20)
BUN/CREAT SERPL: 13 (ref 12–20)
BUN/CREAT SERPL: 9 (ref 12–20)
CA-I BLD-MCNC: 7.7 MG/DL (ref 8.5–10.1)
CA-I BLD-MCNC: 7.8 MG/DL (ref 8.5–10.1)
CA-I BLD-MCNC: 7.8 MG/DL (ref 8.5–10.1)
CA-I BLD-MCNC: 7.9 MG/DL (ref 8.5–10.1)
CA-I BLD-MCNC: 7.9 MG/DL (ref 8.5–10.1)
CA-I BLD-MCNC: 8.1 MG/DL (ref 8.5–10.1)
CA-I BLD-MCNC: 8.4 MG/DL (ref 8.5–10.1)
CALCULATED P AXIS, ECG09: 47 DEGREES
CALCULATED R AXIS, ECG10: 32 DEGREES
CALCULATED T AXIS, ECG11: 25 DEGREES
CHLORIDE SERPL-SCNC: 118 MMOL/L (ref 97–108)
CHLORIDE SERPL-SCNC: 119 MMOL/L (ref 97–108)
CHLORIDE SERPL-SCNC: 120 MMOL/L (ref 97–108)
CHLORIDE SERPL-SCNC: 120 MMOL/L (ref 97–108)
CHLORIDE SERPL-SCNC: 121 MMOL/L (ref 97–108)
CHLORIDE SERPL-SCNC: 121 MMOL/L (ref 97–108)
CHLORIDE SERPL-SCNC: 122 MMOL/L (ref 97–108)
CO2 SERPL-SCNC: 12 MMOL/L (ref 21–32)
CO2 SERPL-SCNC: 13 MMOL/L (ref 21–32)
CO2 SERPL-SCNC: 14 MMOL/L (ref 21–32)
CO2 SERPL-SCNC: 14 MMOL/L (ref 21–32)
CO2 SERPL-SCNC: 15 MMOL/L (ref 21–32)
CO2 SERPL-SCNC: 16 MMOL/L (ref 21–32)
CO2 SERPL-SCNC: 16 MMOL/L (ref 21–32)
CREAT SERPL-MCNC: 0.54 MG/DL (ref 0.55–1.02)
CREAT SERPL-MCNC: 0.64 MG/DL (ref 0.55–1.02)
CREAT SERPL-MCNC: 0.65 MG/DL (ref 0.55–1.02)
CREAT SERPL-MCNC: 0.68 MG/DL (ref 0.55–1.02)
CREAT SERPL-MCNC: 0.71 MG/DL (ref 0.55–1.02)
CREAT SERPL-MCNC: 0.72 MG/DL (ref 0.55–1.02)
CREAT SERPL-MCNC: 0.72 MG/DL (ref 0.55–1.02)
DATE LAST DOSE: 0
DIAGNOSIS, 93000: NORMAL
DIFFERENTIAL METHOD BLD: ABNORMAL
EOSINOPHIL # BLD: 0 K/UL (ref 0–0.4)
EOSINOPHIL NFR BLD: 0 % (ref 0–7)
ERYTHROCYTE [DISTWIDTH] IN BLOOD BY AUTOMATED COUNT: 16.1 % (ref 11.5–14.5)
EST. AVERAGE GLUCOSE BLD GHB EST-MCNC: 189 MG/DL
GLUCOSE BLD STRIP.AUTO-MCNC: 103 MG/DL (ref 65–100)
GLUCOSE BLD STRIP.AUTO-MCNC: 109 MG/DL (ref 65–100)
GLUCOSE BLD STRIP.AUTO-MCNC: 127 MG/DL (ref 65–100)
GLUCOSE BLD STRIP.AUTO-MCNC: 128 MG/DL (ref 65–100)
GLUCOSE BLD STRIP.AUTO-MCNC: 143 MG/DL (ref 65–100)
GLUCOSE BLD STRIP.AUTO-MCNC: 143 MG/DL (ref 65–100)
GLUCOSE BLD STRIP.AUTO-MCNC: 147 MG/DL (ref 65–100)
GLUCOSE BLD STRIP.AUTO-MCNC: 157 MG/DL (ref 65–100)
GLUCOSE BLD STRIP.AUTO-MCNC: 175 MG/DL (ref 65–100)
GLUCOSE BLD STRIP.AUTO-MCNC: 231 MG/DL (ref 65–100)
GLUCOSE BLD STRIP.AUTO-MCNC: 81 MG/DL (ref 65–100)
GLUCOSE SERPL-MCNC: 120 MG/DL (ref 65–100)
GLUCOSE SERPL-MCNC: 147 MG/DL (ref 65–100)
GLUCOSE SERPL-MCNC: 148 MG/DL (ref 65–100)
GLUCOSE SERPL-MCNC: 156 MG/DL (ref 65–100)
GLUCOSE SERPL-MCNC: 213 MG/DL (ref 65–100)
GLUCOSE SERPL-MCNC: 283 MG/DL (ref 65–100)
GLUCOSE SERPL-MCNC: 72 MG/DL (ref 65–100)
HBA1C MFR BLD: 8.2 % (ref 4–5.6)
HCT VFR BLD AUTO: 30.7 % (ref 35–47)
HGB BLD-MCNC: 10 G/DL (ref 11.5–16)
IMM GRANULOCYTES # BLD AUTO: 0 K/UL (ref 0–0.04)
IMM GRANULOCYTES NFR BLD AUTO: 0 % (ref 0–0.5)
LACTATE SERPL-SCNC: 1.4 MMOL/L (ref 0.4–2)
LYMPHOCYTES # BLD: 2.2 K/UL (ref 0.8–3.5)
LYMPHOCYTES NFR BLD: 21 % (ref 12–49)
MAGNESIUM SERPL-MCNC: 1.7 MG/DL (ref 1.6–2.4)
MAGNESIUM SERPL-MCNC: 1.8 MG/DL (ref 1.6–2.4)
MAGNESIUM SERPL-MCNC: 1.9 MG/DL (ref 1.6–2.4)
MAGNESIUM SERPL-MCNC: 2 MG/DL (ref 1.6–2.4)
MCH RBC QN AUTO: 28.9 PG (ref 26–34)
MCHC RBC AUTO-ENTMCNC: 32.6 G/DL (ref 30–36.5)
MCV RBC AUTO: 88.7 FL (ref 80–99)
MONOCYTES # BLD: 0.7 K/UL (ref 0–1)
MONOCYTES NFR BLD: 7 % (ref 5–13)
NEUTS SEG # BLD: 7.4 K/UL (ref 1.8–8)
NEUTS SEG NFR BLD: 72 % (ref 32–75)
P-R INTERVAL, ECG05: 150 MS
PERFORMED BY, TECHID: ABNORMAL
PERFORMED BY, TECHID: NORMAL
PLATELET # BLD AUTO: 303 K/UL (ref 150–400)
PMV BLD AUTO: 12.1 FL (ref 8.9–12.9)
POTASSIUM SERPL-SCNC: 3.3 MMOL/L (ref 3.5–5.1)
POTASSIUM SERPL-SCNC: 3.6 MMOL/L (ref 3.5–5.1)
POTASSIUM SERPL-SCNC: 3.7 MMOL/L (ref 3.5–5.1)
POTASSIUM SERPL-SCNC: 3.9 MMOL/L (ref 3.5–5.1)
POTASSIUM SERPL-SCNC: 3.9 MMOL/L (ref 3.5–5.1)
POTASSIUM SERPL-SCNC: 4 MMOL/L (ref 3.5–5.1)
POTASSIUM SERPL-SCNC: 4.2 MMOL/L (ref 3.5–5.1)
Q-T INTERVAL, ECG07: 374 MS
QRS DURATION, ECG06: 80 MS
QTC CALCULATION (BEZET), ECG08: 467 MS
RBC # BLD AUTO: 3.46 M/UL (ref 3.8–5.2)
REPORTED DOSE,DOSE: 0 UNITS
SODIUM SERPL-SCNC: 142 MMOL/L (ref 136–145)
SODIUM SERPL-SCNC: 143 MMOL/L (ref 136–145)
SODIUM SERPL-SCNC: 144 MMOL/L (ref 136–145)
SODIUM SERPL-SCNC: 145 MMOL/L (ref 136–145)
SODIUM SERPL-SCNC: 145 MMOL/L (ref 136–145)
VANCOMYCIN TROUGH SERPL-MCNC: 9.6 UG/ML (ref 5–10)
VENTRICULAR RATE, ECG03: 94 BPM
WBC # BLD AUTO: 10.4 K/UL (ref 3.6–11)

## 2020-11-27 PROCEDURE — 83605 ASSAY OF LACTIC ACID: CPT

## 2020-11-27 PROCEDURE — 65270000029 HC RM PRIVATE

## 2020-11-27 PROCEDURE — 96376 TX/PRO/DX INJ SAME DRUG ADON: CPT

## 2020-11-27 PROCEDURE — 74011250637 HC RX REV CODE- 250/637: Performed by: INTERNAL MEDICINE

## 2020-11-27 PROCEDURE — 82962 GLUCOSE BLOOD TEST: CPT

## 2020-11-27 PROCEDURE — 83735 ASSAY OF MAGNESIUM: CPT

## 2020-11-27 PROCEDURE — 74011000258 HC RX REV CODE- 258: Performed by: EMERGENCY MEDICINE

## 2020-11-27 PROCEDURE — 36415 COLL VENOUS BLD VENIPUNCTURE: CPT

## 2020-11-27 PROCEDURE — 74011250636 HC RX REV CODE- 250/636: Performed by: INTERNAL MEDICINE

## 2020-11-27 PROCEDURE — 74011636637 HC RX REV CODE- 636/637: Performed by: EMERGENCY MEDICINE

## 2020-11-27 PROCEDURE — 80048 BASIC METABOLIC PNL TOTAL CA: CPT

## 2020-11-27 PROCEDURE — 74011000258 HC RX REV CODE- 258: Performed by: INTERNAL MEDICINE

## 2020-11-27 PROCEDURE — 96375 TX/PRO/DX INJ NEW DRUG ADDON: CPT

## 2020-11-27 PROCEDURE — 96366 THER/PROPH/DIAG IV INF ADDON: CPT

## 2020-11-27 PROCEDURE — 80202 ASSAY OF VANCOMYCIN: CPT

## 2020-11-27 PROCEDURE — 85025 COMPLETE CBC W/AUTO DIFF WBC: CPT

## 2020-11-27 RX ORDER — MAGNESIUM SULFATE HEPTAHYDRATE 40 MG/ML
2 INJECTION, SOLUTION INTRAVENOUS ONCE
Status: COMPLETED | OUTPATIENT
Start: 2020-11-27 | End: 2020-11-27

## 2020-11-27 RX ORDER — FLUOXETINE HYDROCHLORIDE 20 MG/1
CAPSULE ORAL DAILY
Status: DISCONTINUED | OUTPATIENT
Start: 2020-11-28 | End: 2020-11-28 | Stop reason: HOSPADM

## 2020-11-27 RX ORDER — TOPIRAMATE 100 MG/1
100 TABLET, FILM COATED ORAL 2 TIMES DAILY
Status: DISCONTINUED | OUTPATIENT
Start: 2020-11-27 | End: 2020-11-28 | Stop reason: HOSPADM

## 2020-11-27 RX ADMIN — CEFTRIAXONE 1 G: 1 INJECTION, POWDER, FOR SOLUTION INTRAMUSCULAR; INTRAVENOUS at 14:50

## 2020-11-27 RX ADMIN — SODIUM CHLORIDE 4.7 UNITS/HR: 0.9 INJECTION INTRAVENOUS at 00:57

## 2020-11-27 RX ADMIN — POTASSIUM CHLORIDE, DEXTROSE MONOHYDRATE AND SODIUM CHLORIDE 125 ML/HR: 150; 5; 900 INJECTION, SOLUTION INTRAVENOUS at 09:04

## 2020-11-27 RX ADMIN — VANCOMYCIN HYDROCHLORIDE 1250 MG: 1.25 INJECTION, POWDER, LYOPHILIZED, FOR SOLUTION INTRAVENOUS at 16:59

## 2020-11-27 RX ADMIN — VANCOMYCIN HYDROCHLORIDE 750 MG: 750 INJECTION, POWDER, LYOPHILIZED, FOR SOLUTION INTRAVENOUS at 01:39

## 2020-11-27 RX ADMIN — MAGNESIUM SULFATE HEPTAHYDRATE 2 G: 40 INJECTION, SOLUTION INTRAVENOUS at 13:00

## 2020-11-27 RX ADMIN — VANCOMYCIN HYDROCHLORIDE 750 MG: 750 INJECTION, POWDER, LYOPHILIZED, FOR SOLUTION INTRAVENOUS at 09:03

## 2020-11-27 RX ADMIN — TOPIRAMATE 100 MG: 100 TABLET, FILM COATED ORAL at 12:35

## 2020-11-27 RX ADMIN — POTASSIUM CHLORIDE, DEXTROSE MONOHYDRATE AND SODIUM CHLORIDE 125 ML/HR: 150; 5; 900 INJECTION, SOLUTION INTRAVENOUS at 00:50

## 2020-11-27 NOTE — CONSULTS
Consult Date: 11/27/2020     CONSULT TO ORTHOPEDIC SURGERY  Consult performed by: Chay Sawant MD  Consult ordered by: Adelita Blair MD  Assessment/Recommendations:   I had a detailed discussion with the patient about her clinical picture, diagnostic impressions and treatment options. The patient's physical findings at this time are consistent with uncomplicated postoperative clinical course after arthroscopic debridement and lysis of adhesions for a frozen shoulder 1 week ago. Her postoperative pain has likewise not shown any evidence of worsening over the last week. I agree with current medical management of her diabetic ketoacidosis. If the patient's symptoms and diabetic control did not show rapid resolution, and/or if localizing findings are noted in her left shoulder, we will consider performing an attempt at a diagnostic arthrocentesis. At this point however, this intervention does not appear to be clinically indicated. We will follow the patient  Until the clinical picture gets resolved. Thank you, Dr. Kelleen Cushing, for the consult. Subjective    History of Presenting Illness: The patient is a 48-year-old lady with a history of type 1 diabetes mellitus, who was seen by me in the emergency room in consultation  For evaluation of her left shoulder, at the request of Dr. Kelleen Cushing from the hospitalist team.    The patient was a good historian, and informed me that she had undergone arthroscopic surgery on her left shoulder a week earlier, by Dr. Short from [facility]. She presented at [facility] on November 26 because of symptoms that made her realized that she was in impending diabetic ketoacidosis. The patient stated that her left shoulder had suffered from adhesive capsulitis for over 2 years, and that her recent surgery was the first surgical intervention.   Since her surgery, the patient admitted that her  Postoperative pain had been moderate, but had not increased or taken a turn for the worse in the past few days. Past Medical History:   Diagnosis Date    Anxiety     [facility]immune disease ([facility]     Bloating     Depression     Diabetes ([facility]     Type I     Diarrhea     DKA (diabetic ketoacidoses) ([facility] 11/26/2020    DM (diabetes mellitus), type 1 ([facility] 11/26/2020    Fatigue     Headache     Kidney stones     Loss of appetite     Migraine 11/26/2020    Nausea       Past Surgical History:   Procedure Laterality Date    HX CHOLECYSTECTOMY  2018    Dr. Linh Manjarrez  2048    Dr. Luong Ree [facility])   [address], [address], 2005    HX GYN  2017    Uterine Ablation      No family history on file.    Social History     Tobacco Use    Smoking status: Never Smoker    Smokeless tobacco: Never Used   Substance Use Topics    Alcohol use: Not Currently       Current Facility-Administered Medications   Medication Dose Route Frequency Provider Last Rate Last Dose    [START ON 11/28/2020] FLUoxetine (PROzac) capsule   Oral DAILY Aruna David MD        topiramate (TOPAMAX) tablet 100 mg  100 mg Oral BID Jorge Wayne MD   100 mg at 11/27/20 1235    insulin pump (PATIENT SUPPLIED)   SubCUTAneous CONTINUOUS Ronald SHIELDS MD   0.6 Units/hr at 11/27/20 1155    vancomycin (VANCOCIN) 1,250 mg in 0.9% sodium chloride 250 mL (VIAL-MATE)  1,250 mg IntraVENous Q8H Jorge Bautista MD        dextrose (D50W) injection syrg 12.5 g  25 mL IntraVENous PRN Glo Segura MD   12.5 g at 11/26/20 1646    acetaminophen (TYLENOL) tablet 650 mg  650 mg Oral Q4H PRN Aruna David MD        Or    acetaminophen (TYLENOL) suppository 650 mg  650 mg Rectal Q4H PRN Aruna David MD        Or    acetaminophen (TYLENOL) solution 650 mg  650 mg Oral Q4H PRN Ronald SHIELDS MD        ondansetron (ZOFRAN ODT) tablet 4 mg  4 mg Oral Q4H PRN Aruna David MD        Or    ondansetron [facility] injection 4 mg 4 mg IntraVENous Q4H PRN April SHIELDS MD   4 mg at 11/26/20 2042    magnesium hydroxide (MILK OF MAGNESIA) 400 mg/5 mL oral suspension 30 mL  30 mL Oral DAILY PRN Mikey Davalos MD        enoxaparin (LOVENOX) injection 40 mg  40 mg SubCUTAneous Q24H Mikey Davalos MD   Stopped at 11/27/20 1519    cefTRIAXone (ROCEPHIN) 1 g in 0.9% sodium chloride (MBP/ADV) 50 mL MBP  1 g IntraVENous Q24H Jorge Martínez  mL/hr at 11/27/20 1450 1 g at 11/27/20 1450    Vancomycin dose per pharmacy protocol   Other Rx Dosing/Monitoring Mikey Davalos MD        dextrose 5% - 0.9% NaCl with KCl 20 mEq/L infusion  50 mL/hr IntraVENous CONTINUOUS April SHIELDS MD 50 mL/hr at 11/27/20 1140 50 mL/hr at 11/27/20 1140     Current Outpatient Medications   Medication Sig Dispense Refill    insulin aspart protamine/insulin aspart (NovoLOG Mix 70-30 U-100 Insuln) 100 unit/mL (70-30) injection by SubCUTAneous route.  traZODone (DESYREL) 50 mg tablet Take  by mouth nightly.  FLUoxetine (PROzac) 20 mg tablet Take 20 mg by mouth daily.  topiramate (Topamax) 100 mg tablet Take  by mouth two (2) times a day.  cyanocobalamin (Vitamin B-12) 2,500 mcg sublingual tablet Take 2,500 mcg by mouth daily.  cholecalciferol (VITAMIN D3) (50,000 UNITS /1250 MCG) capsule Take  by mouth every seven (7) days. Review of Systems:  Review of Systems   Constitutional: Positive for malaise/fatigue. HENT: Negative. Negative for sore throat. Eyes: Negative. Respiratory: Positive for shortness of breath. Negative for cough, hemoptysis, sputum production and wheezing. Cardiovascular: Negative. Gastrointestinal: Positive for diarrhea, nausea and vomiting. Vomiting started this morning at 3 AM.  Diarrhea has been chronic and intermittent and not uncommon for her after her bariatric surgery remotely. Genitourinary: Negative. Musculoskeletal: Negative.          Recent arthroscopic left shoulder surgery, pain persists, recently started PT. Skin: Negative. Neurological: Positive for dizziness. Endo/Heme/Allergies: Negative. Psychiatric/Behavioral: Negative. Objective     Vital signs for last 24 hours:  Visit Vitals  /84   Pulse 82   Temp 98 °F (36.7 °C)   Resp 18   Ht 5' 1\" (1.549 m)   Wt 68.5 kg (151 lb)   SpO2 99%   BMI 28.53 kg/m²       Intake/Output this shift:  Current Shift: 11/27 0701 - 11/27 1900  In: 136.9 [I.V.:136.9]  Out: -   Last 3 Shifts: No intake/output data recorded.     Data Review:   Recent Results (from the past 24 hour(s))   GLUCOSE, POC    Collection Time: 11/26/20  4:42 PM   Result Value Ref Range    Glucose (POC) 65 65 - 100 mg/dL    Performed by [address] [place] [address], POC    Collection Time: 11/26/20  5:06 PM   Result Value Ref Range    Glucose (POC) 111 (H) 65 - 100 mg/dL    Performed by Gifty Alex    MAGNESIUM    Collection Time: 11/26/20  6:00 PM   Result Value Ref Range    Magnesium 1.6 1.6 - 2.4 mg/dL   D DIMER    Collection Time: 11/26/20  6:47 PM   Result Value Ref Range    D DIMER 1.53 (H) <0.50 ug/ml(FEU)   METABOLIC PANEL, BASIC    Collection Time: 11/26/20  8:10 PM   Result Value Ref Range    Sodium 140 136 - 145 mmol/L    Potassium 4.0 3.5 - 5.1 mmol/L    Chloride 115 (H) 97 - 108 mmol/L    CO2 10 (LL) 21 - 32 mmol/L    Anion gap 15 5 - 15 mmol/L    Glucose 292 (H) 65 - 100 mg/dL    BUN 11 6 - 20 mg/dL    Creatinine 0.62 0.55 - 1.02 mg/dL    BUN/Creatinine ratio 18 12 - 20      GFR est AA >60 >60 ml/min/1.73m2    GFR est non-AA >60 >60 ml/min/1.73m2    Calcium 8.0 (L) 8.5 - 18.5 mg/dL   METABOLIC PANEL, BASIC    Collection Time: 11/26/20  9:00 PM   Result Value Ref Range    Sodium 140 136 - 145 mmol/L    Potassium 4.1 3.5 - 5.1 mmol/L    Chloride 115 (H) 97 - 108 mmol/L    CO2 9 (LL) 21 - 32 mmol/L    Anion gap 16 (H) 5 - 15 mmol/L    Glucose 292 (H) 65 - 100 mg/dL    BUN 10 6 - 20 mg/dL    Creatinine 0.64 0.55 - 1.02 mg/dL BUN/Creatinine ratio 16 12 - 20      GFR est AA >60 >60 ml/min/1.73m2    GFR est non-AA >60 >60 ml/min/1.73m2    Calcium 8.1 (L) 8.5 - 10.1 mg/dL   GLUCOSE, POC    Collection Time: 11/26/20  9:10 PM   Result Value Ref Range    Glucose (POC) 344 (H) 65 - 100 mg/dL    Performed by Everton Ayala, POC    Collection Time: 11/26/20 10:16 PM   Result Value Ref Range    Glucose (POC) 438 (H) 65 - 100 mg/dL    Performed by [address] [place] [address], POC    Collection Time: 11/26/20 11:17 PM   Result Value Ref Range    Glucose (POC) 357 (H) 65 - 100 mg/dL    Performed by Everton Ayala, POC    Collection Time: 11/27/20 12:52 AM   Result Value Ref Range    Glucose (POC) 231 (H) 65 - 100 mg/dL    Performed by [employer], BASIC    Collection Time: 11/27/20  2:00 AM   Result Value Ref Range    Sodium 143 136 - 145 mmol/L    Potassium 4.0 3.5 - 5.1 mmol/L    Chloride 120 (H) 97 - 108 mmol/L    CO2 12 (LL) 21 - 32 mmol/L    Anion gap 11 5 - 15 mmol/L    Glucose 213 (H) 65 - 100 mg/dL    BUN 9 6 - 20 mg/dL    Creatinine 0.71 0.55 - 1.02 mg/dL    BUN/Creatinine ratio 13 12 - 20      GFR est AA >60 >60 ml/min/1.73m2    GFR est non-AA >60 >60 ml/min/1.73m2    Calcium 7.8 (L) 8.5 - 10.1 mg/dL   MAGNESIUM    Collection Time: 11/27/20  2:00 AM   Result Value Ref Range    Magnesium 1.9 1.6 - 2.4 mg/dL   GLUCOSE, POC    Collection Time: 11/27/20  2:48 AM   Result Value Ref Range    Glucose (POC) 175 (H) 65 - 100 mg/dL    Performed by Wyatt Joiner    METABOLIC PANEL, BASIC    Collection Time: 11/27/20  3:54 AM   Result Value Ref Range    Sodium 142 136 - 145 mmol/L    Potassium 3.9 3.5 - 5.1 mmol/L    Chloride 118 (H) 97 - 108 mmol/L    CO2 13 (LL) 21 - 32 mmol/L    Anion gap 11 5 - 15 mmol/L    Glucose 156 (H) 65 - 100 mg/dL    BUN 8 6 - 20 mg/dL    Creatinine 0.72 0.55 - 1.02 mg/dL    BUN/Creatinine ratio 11 (L) 12 - 20      GFR est AA >60 >60 ml/min/1.73m2    GFR est non-AA >60 >60 ml/min/1.73m2    Calcium 8.4 (L) 8.5 - 10.1 mg/dL   MAGNESIUM    Collection Time: 11/27/20  3:54 AM   Result Value Ref Range    Magnesium 1.8 1.6 - 2.4 mg/dL   LACTIC ACID    Collection Time: 11/27/20  3:54 AM   Result Value Ref Range    Lactic acid 1.4 0.4 - 2.0 mmol/L   GLUCOSE, POC    Collection Time: 11/27/20  4:34 AM   Result Value Ref Range    Glucose (POC) 157 (H) 65 - 100 mg/dL    Performed by [address], POC    Collection Time: 11/27/20  5:32 AM   Result Value Ref Range    Glucose (POC) 127 (H) 65 - 100 mg/dL    Performed by [address], POC    Collection Time: 11/27/20  7:27 AM   Result Value Ref Range    Glucose (POC) 103 (H) 65 - 100 mg/dL    Performed by [address], POC    Collection Time: 11/27/20  9:16 AM   Result Value Ref Range    Glucose (POC) 81 65 - 100 mg/dL    Performed by [id number] [place] [address] Sw    Collection Time: 11/27/20  9:40 AM   Result Value Ref Range    Magnesium 1.7 1.6 - 2.4 mg/dL   METABOLIC PANEL, BASIC    Collection Time: 11/27/20  9:40 AM   Result Value Ref Range    Sodium 145 136 - 145 mmol/L    Potassium 3.9 3.5 - 5.1 mmol/L    Chloride 122 (H) 97 - 108 mmol/L    CO2 14 (LL) 21 - 32 mmol/L    Anion gap 9 5 - 15 mmol/L    Glucose 72 65 - 100 mg/dL    BUN 7 6 - 20 mg/dL    Creatinine 0.72 0.55 - 1.02 mg/dL    BUN/Creatinine ratio 10 (L) 12 - 20      GFR est AA >60 >60 ml/min/1.73m2    GFR est non-AA >60 >60 ml/min/1.73m2    Calcium 7.9 (L) 8.5 - 10.1 mg/dL   GLUCOSE, POC    Collection Time: 11/27/20 10:41 AM   Result Value Ref Range    Glucose (POC) 109 (H) 65 - 100 mg/dL    Performed by [id number] [place] [address], BASIC    Collection Time: 11/27/20  1:24 PM   Result Value Ref Range    Sodium 144 136 - 145 mmol/L    Potassium 3.7 3.5 - 5.1 mmol/L    Chloride 121 (H) 97 - 108 mmol/L    CO2 14 (LL) 21 - 32 mmol/L    Anion gap 9 5 - 15 mmol/L    Glucose 148 (H) 65 - 100 mg/dL    BUN 7 6 - 20 mg/dL Creatinine 0.65 0.55 - 1.02 mg/dL    BUN/Creatinine ratio 11 (L) 12 - 20      GFR est AA >60 >60 ml/min/1.73m2    GFR est non-AA >60 >60 ml/min/1.73m2    Calcium 7.9 (L) 8.5 - 10.1 mg/dL   CBC WITH AUTOMATED DIFF    Collection Time: 11/27/20  1:27 PM   Result Value Ref Range    WBC 10.4 3.6 - 11.0 K/uL    RBC 3.46 (L) 3.80 - 5.20 M/uL    HGB 10.0 (L) 11.5 - 16.0 g/dL    HCT 30.7 (L) 35.0 - 47.0 %    MCV 88.7 80.0 - 99.0 FL    MCH 28.9 26.0 - 34.0 PG    MCHC 32.6 30.0 - 36.5 g/dL    RDW 16.1 (H) 11.5 - 14.5 %    PLATELET 958 736 - 990 K/uL    MPV 12.1 8.9 - 12.9 FL    NEUTROPHILS 72 32 - 75 %    LYMPHOCYTES 21 12 - 49 %    MONOCYTES 7 5 - 13 %    EOSINOPHILS 0 0 - 7 %    BASOPHILS 0 0 - 1 %    IMMATURE GRANULOCYTES 0 0.0 - 0.5 %    ABS. NEUTROPHILS 7.4 1.8 - 8.0 K/UL    ABS. LYMPHOCYTES 2.2 0.8 - 3.5 K/UL    ABS. MONOCYTES 0.7 0.0 - 1.0 K/UL    ABS. EOSINOPHILS 0.0 0.0 - 0.4 K/UL    ABS. BASOPHILS 0.0 0.0 - 0.1 K/UL    ABS. IMM.  GRANS. 0.0 0.00 - 0.04 K/UL    DF AUTOMATED     METABOLIC PANEL, BASIC    Collection Time: 11/27/20  1:27 PM   Result Value Ref Range    Sodium 145 136 - 145 mmol/L    Potassium 3.6 3.5 - 5.1 mmol/L    Chloride 120 (H) 97 - 108 mmol/L    CO2 15 (LL) 21 - 32 mmol/L    Anion gap 10 5 - 15 mmol/L    Glucose 147 (H) 65 - 100 mg/dL    BUN 7 6 - 20 mg/dL    Creatinine 0.68 0.55 - 1.02 mg/dL    BUN/Creatinine ratio 10 (L) 12 - 20      GFR est AA >60 >60 ml/min/1.73m2    GFR est non-AA >60 >60 ml/min/1.73m2    Calcium 8.1 (L) 8.5 - 10.1 mg/dL   VANCOMYCIN, TROUGH    Collection Time: 11/27/20  1:27 PM   Result Value Ref Range    Vancomycin,trough 9.6 5.0 - 10.0 ug/mL    Reported dose date 0      Reported dose: 0 Units   GLUCOSE, POC    Collection Time: 11/27/20  1:27 PM   Result Value Ref Range    Glucose (POC) 147 (H) 65 - 100 mg/dL    Performed by Jose Gonzalez, POC    Collection Time: 11/27/20  3:58 PM   Result Value Ref Range    Glucose (POC) 143 (H) 65 - 100 mg/dL    Performed by YENIFER RODRIGUEZ        Physical Exam:      Visit Vitals  /58    Pulse 98   Temp 98.4 °F (36.9 °C)   Resp 18   Ht 5' 1\" (1.549 m)   Wt 68.5 kg (151 lb)   SpO2 100%   BMI 28.53 kg/m²      O2 Device: Room air     General:  Alert, cooperative, no distress, appears stated age. Head:  Normocephalic, without obvious abnormality, atraumatic. Eyes:  Conjunctivae/corneas clear. EOMs intact. Nose: Nares normal. Septum midline. Mucosa normal. No drainage or sinus tenderness. Throat: Lips, mucosa, and tongue normal. Teeth and gums normal.   Neck: Supple, symmetrical, trachea midline,   Back:   Symmetric, no curvature. ROM normal.    Lungs:    symmetric expansion. No dyspnea. Heart:     Regular rate and rhythm   Abdomen:   Soft, non-tender. Bowel sounds normal. No masses,  No organomegaly. Extremities:   examination of the left shoulder showed satisfactory healing of arthroscopic portals with no martha-incisional erythema, and no drainage. Minimal swelling was appreciated around the shoulder,   With minimal tenderness noted, as consistent with history of recent surgery. Range of motion was moderately restricted. Aggressive test of full range of motion was not performed. Pulses: 2+ and symmetric all extremities. Skin: Skin color, texture, turgor normal. No rashes or lesions   Neurologic: CNII-XII intact. No motor or sensory deficits.

## 2020-11-27 NOTE — ED NOTES
Report received from Michelle Houston for transfer of care at this time. Spoke with MICHELLE Clark to get okay for patient to use home sensor to avoid getting finger sticks q1 hr. Will use patient home sensor for monitoring. Pt resting in bed with VSS. No request or needs at this time.

## 2020-11-27 NOTE — PROGRESS NOTES
Hospitalist Progress Note               Daily Progress Note: 11/27/2020  40f type 1 diabetic admitted with dka. Dka protocol initiated, ag resolved. Concern over recent shoulder arthroscopic procedure and potential associated infection, empirically started on Ceftriaxone/Vanco pending further ortho recs. Stopped iv inslin 11/27 while resuming pta insulin pump- maintaining same settings. Downgrade to medical/    Subjective: The patient is seen for follow  up. Tachypnea last evening- settled down. CTA neg for pna or pe.   c/o persistent left shoulder pain. No n/v and tolerating oral.     Stopping insulin drip as ag resolved. Problem List:  Problem List as of 11/27/2020 Date Reviewed: 11/26/2020          Codes Class Noted - Resolved    Shoulder pain ICD-10-CM: M25.519  ICD-9-CM: 719.41  11/27/2020 - Present        SIRS (systemic inflammatory response syndrome) ([facility] ICD-10-CM: R65.10  ICD-9-CM: 995.90  11/27/2020 - Present        * (Principal) DKA (diabetic ketoacidoses) ([facility] ICD-10-CM: E11.10  ICD-9-CM: 250.12  11/26/2020 - Present        DM (diabetes mellitus), type 1 ([facility] (Chronic) ICD-10-CM: E10.9  ICD-9-CM: 250.01  11/26/2020 - Present        Migraine (Chronic) ICD-10-CM: V57.778  ICD-9-CM: 346.90  11/26/2020 - Present        Anxiety (Chronic) ICD-10-CM: F41.9  ICD-9-CM: 300.00  11/26/2020 - Present        Vitamin D deficiency (Chronic) ICD-10-CM: E55.9  ICD-9-CM: 268.9  11/26/2020 - Present              Medications reviewed  Current Facility-Administered Medications   Medication Dose Route Frequency    . PHARMACY TO SUBSTITUTE PER PROTOCOL (Reordered from: FLUoxetine (PROzac) 20 mg tablet)    Per Protocol    topiramate (TOPAMAX) tablet 100 mg  100 mg Oral BID    magnesium sulfate 2 g/50 ml IVPB (premix or compounded)  2 g IntraVENous ONCE    insulin regular (NOVOLIN R, HUMULIN R) 100 Units in 0.9% sodium chloride 100 mL infusion  7 Units/hr IntraVENous TITRATE    dextrose (D50W) injection syrg 12.5 g  25 mL IntraVENous PRN    0.9% sodium chloride infusion  150 mL/hr IntraVENous CONTINUOUS    acetaminophen (TYLENOL) tablet 650 mg  650 mg Oral Q4H PRN    Or    acetaminophen (TYLENOL) suppository 650 mg  650 mg Rectal Q4H PRN    Or    acetaminophen (TYLENOL) solution 650 mg  650 mg Oral Q4H PRN    ondansetron (ZOFRAN ODT) tablet 4 mg  4 mg Oral Q4H PRN    Or    ondansetron (ZOFRAN) injection 4 mg  4 mg IntraVENous Q4H PRN    magnesium hydroxide (MILK OF MAGNESIA) 400 mg/5 mL oral suspension 30 mL  30 mL Oral DAILY PRN    enoxaparin (LOVENOX) injection 40 mg  40 mg SubCUTAneous Q24H    cefTRIAXone (ROCEPHIN) 1 g in 0.9% sodium chloride (MBP/ADV) 50 mL MBP  1 g IntraVENous Q24H    Vancomycin dose per pharmacy protocol   Other Rx Dosing/Monitoring    dextrose 5% - 0.9% NaCl with KCl 20 mEq/L infusion  125 mL/hr IntraVENous CONTINUOUS    vancomycin (VANCOCIN) 750 mg in 0.9% sodium chloride 250 mL (VIAL-MATE)  750 mg IntraVENous Q8H     Current Outpatient Medications   Medication Sig    insulin aspart protamine/insulin aspart (NovoLOG Mix 70-30 U-100 Insuln) 100 unit/mL (70-30) injection by SubCUTAneous route.  traZODone (DESYREL) 50 mg tablet Take  by mouth nightly.  FLUoxetine (PROzac) 20 mg tablet Take 20 mg by mouth daily.  topiramate (Topamax) 100 mg tablet Take  by mouth two (2) times a day.  cyanocobalamin (Vitamin B-12) 2,500 mcg sublingual tablet Take 2,500 mcg by mouth daily.  cholecalciferol (VITAMIN D3) (50,000 UNITS /1250 MCG) capsule Take  by mouth every seven (7) days. Review of Systems:   Review of Systems   Constitutional: Negative for chills and fever. HENT: Negative. Eyes: Negative. Respiratory: Negative. Cardiovascular: Negative. Gastrointestinal: Negative. Genitourinary: Negative. Musculoskeletal:        Left shoulder pain, recent arthroscopic procedure. Skin: Negative.         Objective:   Physical Exam:     Visit Vitals  /66 (BP 1 Location: Left arm)   Pulse 90   Temp 98 °F (36.7 °C)   Resp 18   Ht 5' 1\" (1.549 m)   Wt 68.5 kg (151 lb)   SpO2 99%   BMI 28.53 kg/m²      O2 Device: Room air    Temp (24hrs), Av.2 °F (36.8 °C), Min:98 °F (36.7 °C), Max:98.4 °F (36.9 °C)    No intake/output data recorded. No intake/output data recorded. General:  Alert, cooperative, no distress, appears stated age. Lungs:   Clear to auscultation bilaterally. Chest wall:  No tenderness or deformity. Heart:  Regular rate and rhythm, S1, S2 normal, no murmur, click, rub or gallop. Abdomen:   Soft, non-tender. Bowel sounds normal. No masses,  No organomegaly. Extremities: + pain rom left shoulder and tender to palp. No erythema   Pulses: 2+ and symmetric all extremities. Skin: Skin color, texture, turgor normal. No rashes or lesions   Neurologic: CNII-XII intact. No gross sensory or motor deficits     Data Review:       Recent Days:  Recent Labs     20  1158   WBC 13.0*   HGB 12.3   HCT 38.5        Recent Labs     20  0940 20  0354 20  0200  20  1400 20  1158    142 143   < > 138 135*   K 3.9 3.9 4.0   < > 3.9 4.3   * 118* 120*   < > 114* 105   CO2 14* 13* 12*   < > 15* 13*   GLU 72 156* 213*   < > 182* 335*   BUN 7 8 9   < > 11 13   CREA 0.72 0.72 0.71   < > 0.79 0.93   CA 7.9* 8.4* 7.8*   < > 8.6 8.9   MG 1.7 1.8 1.9   < > 2.2  --    PHOS  --   --   --   --  2.3*  --    ALB  --   --   --   --   --  4.0   TBILI  --   --   --   --   --  0.6   ALT  --   --   --   --   --  42    < > = values in this interval not displayed.      Recent Labs     20  1158   FIO2 21.0       24 Hour Results:  Recent Results (from the past 24 hour(s))   URINALYSIS W/ REFLEX CULTURE    Collection Time: 20 11:45 AM    Specimen: Urine   Result Value Ref Range    Color Yellow/Straw      Appearance Clear Clear      Specific gravity 1.025 1.003 - 1.030      pH (UA) 5.0 5.0 - 8.0      Protein Negative Negative mg/dL    Glucose >300 (A) Negative mg/dL    Ketone 80 (A) Negative mg/dL    Bilirubin Negative Negative      Blood Negative Negative      Urobilinogen 0.1 0.1 - 1.0 EU/dL    Nitrites Negative Negative      Leukocyte Esterase Negative Negative      UA:UC IF INDICATED Culture not indicated by UA result Culture not indicated by UA result      WBC 0-4 0 - 4 /hpf    RBC 0-5 0 - 5 /hpf    Bacteria Negative Negative /hpf    Mucus Trace /lpf   CBC WITH AUTOMATED DIFF    Collection Time: 11/26/20 11:58 AM   Result Value Ref Range    WBC 13.0 (H) 3.6 - 11.0 K/uL    RBC 4.25 3.80 - 5.20 M/uL    HGB 12.3 11.5 - 16.0 g/dL    HCT 38.5 35.0 - 47.0 %    MCV 90.6 80.0 - 99.0 FL    MCH 28.9 26.0 - 34.0 PG    MCHC 31.9 30.0 - 36.5 g/dL    RDW 15.5 (H) 11.5 - 14.5 %    PLATELET 953 004 - 883 K/uL    MPV 12.8 8.9 - 12.9 FL    NEUTROPHILS 90 (H) 32 - 75 %    LYMPHOCYTES 8 (L) 12 - 49 %    MONOCYTES 2 (L) 5 - 13 %    EOSINOPHILS 0 0 - 7 %    BASOPHILS 0 0 - 1 %    IMMATURE GRANULOCYTES 0 0.0 - 0.5 %    ABS. MONOCYTES 0.3 0.0 - 1.0 K/UL    ABS. EOSINOPHILS 0.0 0.0 - 0.4 K/UL    ABS. IMM. GRANS. 0.0 0.00 - 0.04 K/UL    DF AUTOMATED     METABOLIC PANEL, COMPREHENSIVE    Collection Time: 11/26/20 11:58 AM   Result Value Ref Range    Sodium 135 (L) 136 - 145 mmol/L    Potassium 4.3 3.5 - 5.1 mmol/L    Chloride 105 97 - 108 mmol/L    CO2 13 (LL) 21 - 32 mmol/L    Anion gap 17 (H) 5 - 15 mmol/L    Glucose 335 (H) 65 - 100 mg/dL    BUN 13 6 - 20 mg/dL    Creatinine 0.93 0.55 - 1.02 mg/dL    BUN/Creatinine ratio 14 12 - 20      GFR est AA >60 >60 ml/min/1.73m2    GFR est non-AA >60 >60 ml/min/1.73m2    Calcium 8.9 8.5 - 10.1 mg/dL    Bilirubin, total 0.6 0.2 - 1.0 mg/dL    AST (SGOT) 31 15 - 37 U/L    ALT (SGPT) 42 12 - 78 U/L    Alk.  phosphatase 132 (H) 45 - 117 U/L    Protein, total 8.1 6.4 - 8.2 g/dL    Albumin 4.0 3.5 - 5.0 g/dL    Globulin 4.1 (H) 2.0 - 4.0 g/dL    A-G Ratio 1.0 (L) 1.1 - 2.2     VENOUS BLOOD GAS    Collection Time: 11/26/20 11:58 AM   Result Value Ref Range    VENOUS PH 7.073 (LL) 7.31 - 7.41      VENOUS PCO2 33.9 (L) 40 - 50 mmHg    VENOUS PO2 71 (H) 36 - 42 mmHg    VENOUS O2 SATURATION 89 (H) 60 - 80 %    VENOUS BICARBONATE 11 (L) 22 - 26 mmol/L    VENOUS BASE DEFICIT 19.2 (H) 0 - 2 mmol/L    O2 METHOD Room air      FIO2 21.0 %    SITE Right Radial     HCG QL SERUM    Collection Time: 11/26/20 11:58 AM   Result Value Ref Range    HCG, Ql. Negative Negative     LACTIC ACID    Collection Time: 11/26/20  2:00 PM   Result Value Ref Range    Lactic acid 1.4 0.4 - 2.0 mmol/L   PROCALCITONIN    Collection Time: 11/26/20  2:00 PM   Result Value Ref Range    Procalcitonin <0.05 (H) 0 ng/mL   METABOLIC PANEL, BASIC    Collection Time: 11/26/20  2:00 PM   Result Value Ref Range    Sodium 138 136 - 145 mmol/L    Potassium 3.9 3.5 - 5.1 mmol/L    Chloride 114 (H) 97 - 108 mmol/L    CO2 15 (LL) 21 - 32 mmol/L    Anion gap 9 5 - 15 mmol/L    Glucose 182 (H) 65 - 100 mg/dL    BUN 11 6 - 20 mg/dL    Creatinine 0.79 0.55 - 1.02 mg/dL    BUN/Creatinine ratio 14 12 - 20      GFR est AA >60 >60 ml/min/1.73m2    GFR est non-AA >60 >60 ml/min/1.73m2    Calcium 8.6 8.5 - 10.1 mg/dL   MAGNESIUM    Collection Time: 11/26/20  2:00 PM   Result Value Ref Range    Magnesium 2.2 1.6 - 2.4 mg/dL   PHOSPHORUS    Collection Time: 11/26/20  2:00 PM   Result Value Ref Range    Phosphorus 2.3 (L) 2.6 - 4.7 mg/dL   GLUCOSE, POC    Collection Time: 11/26/20  3:06 PM   Result Value Ref Range    Glucose (POC) 126 (H) 65 - 100 mg/dL    Performed by Juaquin Schultz    GLUCOSE, POC    Collection Time: 11/26/20  4:11 PM   Result Value Ref Range    Glucose (POC) 83 65 - 100 mg/dL    Performed by [facility]    EKG, 12 LEAD, INITIAL    Collection Time: 11/26/20  4:13 PM   Result Value Ref Range    Ventricular Rate 94 BPM    Atrial Rate 94 BPM    P-R Interval 150 ms    QRS Duration 80 ms    Q-T Interval 374 ms    QTC Calculation (Bezet) 467 ms    Calculated P Axis 47 degrees    Calculated R Axis 32 degrees    Calculated T Axis 25 degrees    Diagnosis       Normal sinus rhythm  Normal ECG  No previous ECGs available  Confirmed by Chantell Rogers MD, [facility] ([id number]) on 11/27/2020 9:57:31 AM     GLUCOSE, POC    Collection Time: 11/26/20  4:42 PM   Result Value Ref Range    Glucose (POC) 65 65 - 100 mg/dL    Performed by [address] [place] [address], POC    Collection Time: 11/26/20  5:06 PM   Result Value Ref Range    Glucose (POC) 111 (H) 65 - 100 mg/dL    Performed by Aurelia Combzacarias    MAGNESIUM    Collection Time: 11/26/20  6:00 PM   Result Value Ref Range    Magnesium 1.6 1.6 - 2.4 mg/dL   D DIMER    Collection Time: 11/26/20  6:47 PM   Result Value Ref Range    D DIMER 1.53 (H) <0.50 ug/ml(FEU)   METABOLIC PANEL, BASIC    Collection Time: 11/26/20  8:10 PM   Result Value Ref Range    Sodium 140 136 - 145 mmol/L    Potassium 4.0 3.5 - 5.1 mmol/L    Chloride 115 (H) 97 - 108 mmol/L    CO2 10 (LL) 21 - 32 mmol/L    Anion gap 15 5 - 15 mmol/L    Glucose 292 (H) 65 - 100 mg/dL    BUN 11 6 - 20 mg/dL    Creatinine 0.62 0.55 - 1.02 mg/dL    BUN/Creatinine ratio 18 12 - 20      GFR est AA >60 >60 ml/min/1.73m2    GFR est non-AA >60 >60 ml/min/1.73m2    Calcium 8.0 (L) 8.5 - 70.5 mg/dL   METABOLIC PANEL, BASIC    Collection Time: 11/26/20  9:00 PM   Result Value Ref Range    Sodium 140 136 - 145 mmol/L    Potassium 4.1 3.5 - 5.1 mmol/L    Chloride 115 (H) 97 - 108 mmol/L    CO2 9 (LL) 21 - 32 mmol/L    Anion gap 16 (H) 5 - 15 mmol/L    Glucose 292 (H) 65 - 100 mg/dL    BUN 10 6 - 20 mg/dL    Creatinine 0.64 0.55 - 1.02 mg/dL    BUN/Creatinine ratio 16 12 - 20      GFR est AA >60 >60 ml/min/1.73m2    GFR est non-AA >60 >60 ml/min/1.73m2    Calcium 8.1 (L) 8.5 - 10.1 mg/dL   GLUCOSE, POC    Collection Time: 11/26/20  9:10 PM   Result Value Ref Range    Glucose (POC) 344 (H) 65 - 100 mg/dL    Performed by [address] [place] [address] [address], POC    Collection Time: 11/26/20 10:16 PM   Result Value Ref Range    Glucose (POC) 438 (H) 65 - 100 mg/dL    Performed by [facility], POC    Collection Time: 11/26/20 11:17 PM   Result Value Ref Range    Glucose (POC) 357 (H) 65 - 100 mg/dL    Performed by [address] [employer], POC    Collection Time: 11/27/20 12:52 AM   Result Value Ref Range    Glucose (POC) 231 (H) 65 - 100 mg/dL    Performed by [address], BASIC    Collection Time: 11/27/20  2:00 AM   Result Value Ref Range    Sodium 143 136 - 145 mmol/L    Potassium 4.0 3.5 - 5.1 mmol/L    Chloride 120 (H) 97 - 108 mmol/L    CO2 12 (LL) 21 - 32 mmol/L    Anion gap 11 5 - 15 mmol/L    Glucose 213 (H) 65 - 100 mg/dL    BUN 9 6 - 20 mg/dL    Creatinine 0.71 0.55 - 1.02 mg/dL    BUN/Creatinine ratio 13 12 - 20      GFR est AA >60 >60 ml/min/1.73m2    GFR est non-AA >60 >60 ml/min/1.73m2    Calcium 7.8 (L) 8.5 - 10.1 mg/dL   MAGNESIUM    Collection Time: 11/27/20  2:00 AM   Result Value Ref Range    Magnesium 1.9 1.6 - 2.4 mg/dL   GLUCOSE, POC    Collection Time: 11/27/20  2:48 AM   Result Value Ref Range    Glucose (POC) 175 (H) 65 - 100 mg/dL    Performed by [facility]    METABOLIC PANEL, BASIC    Collection Time: 11/27/20  3:54 AM   Result Value Ref Range    Sodium 142 136 - 145 mmol/L    Potassium 3.9 3.5 - 5.1 mmol/L    Chloride 118 (H) 97 - 108 mmol/L    CO2 13 (LL) 21 - 32 mmol/L    Anion gap 11 5 - 15 mmol/L    Glucose 156 (H) 65 - 100 mg/dL    BUN 8 6 - 20 mg/dL    Creatinine 0.72 0.55 - 1.02 mg/dL    BUN/Creatinine ratio 11 (L) 12 - 20      GFR est AA >60 >60 ml/min/1.73m2    GFR est non-AA >60 >60 ml/min/1.73m2    Calcium 8.4 (L) 8.5 - 10.1 mg/dL   MAGNESIUM    Collection Time: 11/27/20  3:54 AM   Result Value Ref Range    Magnesium 1.8 1.6 - 2.4 mg/dL   LACTIC ACID    Collection Time: 11/27/20  3:54 AM   Result Value Ref Range    Lactic acid 1.4 0.4 - 2.0 mmol/L   GLUCOSE, POC    Collection Time: 11/27/20  4:34 AM   Result Value Ref Range    Glucose (POC) 157 (H) 65 - 100 mg/dL    Performed by Carmita Blum, POC    Collection Time: 11/27/20  5:32 AM   Result Value Ref Range    Glucose (POC) 127 (H) 65 - 100 mg/dL    Performed by [address], POC    Collection Time: 11/27/20  7:27 AM   Result Value Ref Range    Glucose (POC) 103 (H) 65 - 100 mg/dL    Performed by [address], POC    Collection Time: 11/27/20  9:16 AM   Result Value Ref Range    Glucose (POC) 81 65 - 100 mg/dL    [address]formed [id number]    Collection Time: 11/27/20  9:40 AM   Result Value Ref Range    Magnesium 1.7 1.6 - 2.4 mg/dL   METABOLIC PANEL, BASIC    Collection Time: 11/27/20  9:40 AM   Result Value Ref Range    Sodium 145 136 - 145 mmol/L    Potassium 3.9 3.5 - 5.1 mmol/L    Chloride 122 (H) 97 - 108 mmol/L    CO2 14 (LL) 21 - 32 mmol/L    Anion gap 9 5 - 15 mmol/L    Glucose 72 65 - 100 mg/dL    BUN 7 6 - 20 mg/dL    Creatinine 0.72 0.55 - 1.02 mg/dL    BUN/Creatinine ratio 10 (L) 12 - 20      GFR est AA >60 >60 ml/min/1.73m2    GFR est non-AA >60 >60 ml/min/1.73m2    Calcium 7.9 (L) 8.5 - 10.1 mg/dL   GLUCOSE, POC    Collection Time: 11/27/20 10:41 AM   Result Value Ref Range    Glucose (POC) 109 (H) 65 - 100 mg/dL    Performed by [address]            Assessment/     Patient Active Problem List   Diagnosis Code    DKA (diabetic ketoacidoses) ([facility] E11.10    DM (diabetes mellitus), type 1 ([facility] E10.9    Migraine G43.909    Anxiety F41.9    Vitamin D deficiency E55.9    Shoulder pain M25.519    SIRS (systemic inflammatory response syndrome) (HCC) R65.10           --Diabetes mellitus type 1 with ketoacidosis: Anion gap closed, remains non anion gap hyperchloremic metabolic acidosis. The hyperchloremic acidosis will slowly resolve as the kidneys excrete ammonium chloride (NH4Cl) and regenerate bicarbonate. Stop drip and resume pump as pta.    --Dehydration, resolved  --SIRS versus sepsis: White count is 13,000 on presentation, repeat is pending. reactive is a possibility though recent left shoulder arthroscopic surgery is a concern and it is warm and pain tender. She had surgery she describes for frozen shoulder last week with Dr. Short through [address] Ortho to see. --Vitamin D deficiency, pta  --History of migraines, none current  --Anxiety           Plan:      --Can downgrade to medical  --stop insulin drip and resume insulin pump per pta settings. --Will continue to monitor bmp as hyperchloremic non anion gap met acidosis. --Orthopedics to see re possiple popst arthroscopic procedure infection. --Cont ceftriaxone/vanco empirically pending further recs. --Hydrocodone as needed for pain  --Home medications are reviewed with her, resumed.      VTEP: Lovenox  GIP: Pepcid  Full CODE STATUS  Disposition: IP admission, a at least 2 midnights here, ICU admission on DKA protocol, sepsis versus SIRS, Ortho to evaluate for possible left shoulder infection status post arthroscopic intervention last week. Anticipate she will go home with outpatient follow-up. Incidentally she is recently started twice weekly physical therapy. We will get PT OT involved. Downgrade to medical.      The hyperchloremic acidosis will slowly resolve as the kidneys excrete ammonium chloride (NH4Cl) and regenerate bicarbonate. Care Plan discussed with: Patient/Family and Nurse    Total time spent with patient: 30 minutes.     Julieta Barrios MD

## 2020-11-27 NOTE — PROGRESS NOTES
Vancomycin Update:11/27/20  Day:2  Vanc Trough resulted at 9.6 today after receiving 3 doses of 750 mg. Pt will receive 1250 mg for the next dose. Ordered Random for tomorrow at 0700  Pharmacy will keep monitoring the levels and adjust the dose accordingly. Thank you.

## 2020-11-27 NOTE — ED NOTES
TRANSFER - OUT REPORT:    Verbal report given to Van(name) on Shasha Bacon  being transferred to (unit) for routine progression of care       Report consisted of patients Situation, Background, Assessment and   Recommendations(SBAR). Information from the following report(s) SBAR and ED Summary was reviewed with the receiving nurse. Lines:   Peripheral IV 11/26/20 Anterior;Right Forearm (Active)       Peripheral IV 11/26/20 Anterior;Right Forearm (Active)       Peripheral IV 11/27/20 Right Hand (Active)   Site Assessment Clean, dry, & intact 11/27/20 0947   Phlebitis Assessment 0 11/27/20 0947   Infiltration Assessment 0 11/27/20 0947   Dressing Status Clean, dry, & intact 11/27/20 0947   Hub Color/Line Status Blue 11/27/20 0947   Alcohol Cap Used Yes 11/27/20 0947        Opportunity for questions and clarification was provided.       Patient transported with:   [facility]

## 2020-11-28 VITALS
HEIGHT: 61 IN | DIASTOLIC BLOOD PRESSURE: 74 MMHG | WEIGHT: 151 LBS | SYSTOLIC BLOOD PRESSURE: 130 MMHG | RESPIRATION RATE: 18 BRPM | HEART RATE: 66 BPM | BODY MASS INDEX: 28.51 KG/M2 | TEMPERATURE: 97.9 F | OXYGEN SATURATION: 95 %

## 2020-11-28 PROBLEM — R65.10 SIRS (SYSTEMIC INFLAMMATORY RESPONSE SYNDROME) (HCC): Status: RESOLVED | Noted: 2020-11-27 | Resolved: 2020-11-28

## 2020-11-28 PROBLEM — E11.10 DKA (DIABETIC KETOACIDOSES): Status: RESOLVED | Noted: 2020-11-26 | Resolved: 2020-11-28

## 2020-11-28 LAB
ANION GAP SERPL CALC-SCNC: 5 MMOL/L (ref 5–15)
ANION GAP SERPL CALC-SCNC: 8 MMOL/L (ref 5–15)
ANION GAP SERPL CALC-SCNC: 8 MMOL/L (ref 5–15)
B-OH-BUTYR SERPL-SCNC: >4.42 MMOL/L
BASOPHILS # BLD: 0 K/UL (ref 0–0.1)
BASOPHILS NFR BLD: 0 % (ref 0–1)
BUN SERPL-MCNC: 5 MG/DL (ref 6–20)
BUN/CREAT SERPL: 11 (ref 12–20)
BUN/CREAT SERPL: 8 (ref 12–20)
BUN/CREAT SERPL: 9 (ref 12–20)
CA-I BLD-MCNC: 7.7 MG/DL (ref 8.5–10.1)
CA-I BLD-MCNC: 7.8 MG/DL (ref 8.5–10.1)
CA-I BLD-MCNC: 7.8 MG/DL (ref 8.5–10.1)
CHLORIDE SERPL-SCNC: 118 MMOL/L (ref 97–108)
CHLORIDE SERPL-SCNC: 118 MMOL/L (ref 97–108)
CHLORIDE SERPL-SCNC: 119 MMOL/L (ref 97–108)
CO2 SERPL-SCNC: 17 MMOL/L (ref 21–32)
CO2 SERPL-SCNC: 17 MMOL/L (ref 21–32)
CO2 SERPL-SCNC: 20 MMOL/L (ref 21–32)
CREAT SERPL-MCNC: 0.45 MG/DL (ref 0.55–1.02)
CREAT SERPL-MCNC: 0.58 MG/DL (ref 0.55–1.02)
CREAT SERPL-MCNC: 0.61 MG/DL (ref 0.55–1.02)
DIFFERENTIAL METHOD BLD: ABNORMAL
EOSINOPHIL # BLD: 0.2 K/UL (ref 0–0.4)
EOSINOPHIL NFR BLD: 4 % (ref 0–7)
ERYTHROCYTE [DISTWIDTH] IN BLOOD BY AUTOMATED COUNT: 16.2 % (ref 11.5–14.5)
GLUCOSE BLD STRIP.AUTO-MCNC: 192 MG/DL (ref 65–100)
GLUCOSE BLD STRIP.AUTO-MCNC: 206 MG/DL (ref 65–100)
GLUCOSE SERPL-MCNC: 155 MG/DL (ref 65–100)
GLUCOSE SERPL-MCNC: 163 MG/DL (ref 65–100)
GLUCOSE SERPL-MCNC: 169 MG/DL (ref 65–100)
HCT VFR BLD AUTO: 27.3 % (ref 35–47)
HGB BLD-MCNC: 8.8 G/DL (ref 11.5–16)
IMM GRANULOCYTES # BLD AUTO: 0 K/UL (ref 0–0.04)
IMM GRANULOCYTES NFR BLD AUTO: 0 % (ref 0–0.5)
LYMPHOCYTES # BLD: 1.5 K/UL (ref 0.8–3.5)
LYMPHOCYTES NFR BLD: 33 % (ref 12–49)
MAGNESIUM SERPL-MCNC: 1.8 MG/DL (ref 1.6–2.4)
MAGNESIUM SERPL-MCNC: 1.9 MG/DL (ref 1.6–2.4)
MAGNESIUM SERPL-MCNC: 2 MG/DL (ref 1.6–2.4)
MCH RBC QN AUTO: 28.4 PG (ref 26–34)
MCHC RBC AUTO-ENTMCNC: 32.2 G/DL (ref 30–36.5)
MCV RBC AUTO: 88.1 FL (ref 80–99)
MONOCYTES # BLD: 0.2 K/UL (ref 0–1)
MONOCYTES NFR BLD: 5 % (ref 5–13)
NEUTS SEG # BLD: 2.6 K/UL (ref 1.8–8)
NEUTS SEG NFR BLD: 58 % (ref 32–75)
PERFORMED BY, TECHID: ABNORMAL
PERFORMED BY, TECHID: ABNORMAL
PLATELET # BLD AUTO: 223 K/UL (ref 150–400)
PMV BLD AUTO: 10.9 FL (ref 8.9–12.9)
POTASSIUM SERPL-SCNC: 3.1 MMOL/L (ref 3.5–5.1)
POTASSIUM SERPL-SCNC: 3.3 MMOL/L (ref 3.5–5.1)
POTASSIUM SERPL-SCNC: 3.7 MMOL/L (ref 3.5–5.1)
RBC # BLD AUTO: 3.1 M/UL (ref 3.8–5.2)
SODIUM SERPL-SCNC: 143 MMOL/L (ref 136–145)
SODIUM SERPL-SCNC: 143 MMOL/L (ref 136–145)
SODIUM SERPL-SCNC: 144 MMOL/L (ref 136–145)
VANCOMYCIN SERPL-MCNC: 24.2 UG/ML
WBC # BLD AUTO: 4.6 K/UL (ref 3.6–11)

## 2020-11-28 PROCEDURE — 83735 ASSAY OF MAGNESIUM: CPT

## 2020-11-28 PROCEDURE — 74011250637 HC RX REV CODE- 250/637: Performed by: INTERNAL MEDICINE

## 2020-11-28 PROCEDURE — 74011250636 HC RX REV CODE- 250/636: Performed by: INTERNAL MEDICINE

## 2020-11-28 PROCEDURE — 82962 GLUCOSE BLOOD TEST: CPT

## 2020-11-28 PROCEDURE — 80048 BASIC METABOLIC PNL TOTAL CA: CPT

## 2020-11-28 PROCEDURE — 36415 COLL VENOUS BLD VENIPUNCTURE: CPT

## 2020-11-28 PROCEDURE — 80202 ASSAY OF VANCOMYCIN: CPT

## 2020-11-28 PROCEDURE — 85025 COMPLETE CBC W/AUTO DIFF WBC: CPT

## 2020-11-28 RX ORDER — TRAZODONE HYDROCHLORIDE 50 MG/1
50 TABLET ORAL
Status: DISCONTINUED | OUTPATIENT
Start: 2020-11-28 | End: 2020-11-28 | Stop reason: HOSPADM

## 2020-11-28 RX ORDER — SODIUM CHLORIDE 0.9 % (FLUSH) 0.9 %
5-40 SYRINGE (ML) INJECTION AS NEEDED
Status: DISCONTINUED | OUTPATIENT
Start: 2020-11-28 | End: 2020-11-28 | Stop reason: HOSPADM

## 2020-11-28 RX ORDER — SODIUM CHLORIDE 0.9 % (FLUSH) 0.9 %
5-40 SYRINGE (ML) INJECTION EVERY 8 HOURS
Status: DISCONTINUED | OUTPATIENT
Start: 2020-11-28 | End: 2020-11-28 | Stop reason: HOSPADM

## 2020-11-28 RX ORDER — LANOLIN ALCOHOL/MO/W.PET/CERES
2500 CREAM (GRAM) TOPICAL DAILY
Status: DISCONTINUED | OUTPATIENT
Start: 2020-11-28 | End: 2020-11-28 | Stop reason: HOSPADM

## 2020-11-28 RX ORDER — POTASSIUM CHLORIDE 750 MG/1
40 TABLET, FILM COATED, EXTENDED RELEASE ORAL
Status: COMPLETED | OUTPATIENT
Start: 2020-11-28 | End: 2020-11-28

## 2020-11-28 RX ORDER — ERGOCALCIFEROL 1.25 MG/1
50000 CAPSULE ORAL
Status: DISCONTINUED | OUTPATIENT
Start: 2020-11-28 | End: 2020-11-28 | Stop reason: HOSPADM

## 2020-11-28 RX ADMIN — FLUOXETINE 20 MG: 20 CAPSULE ORAL at 09:08

## 2020-11-28 RX ADMIN — POTASSIUM CHLORIDE, DEXTROSE MONOHYDRATE AND SODIUM CHLORIDE 50 ML/HR: 150; 5; 900 INJECTION, SOLUTION INTRAVENOUS at 00:19

## 2020-11-28 RX ADMIN — CYANOCOBALAMIN TAB 1000 MCG 2500 MCG: 1000 TAB at 09:08

## 2020-11-28 RX ADMIN — Medication 5 ML: at 02:00

## 2020-11-28 RX ADMIN — TOPIRAMATE 100 MG: 100 TABLET, FILM COATED ORAL at 09:08

## 2020-11-28 RX ADMIN — POTASSIUM CHLORIDE 40 MEQ: 750 TABLET, FILM COATED, EXTENDED RELEASE ORAL at 14:00

## 2020-11-28 RX ADMIN — VANCOMYCIN HYDROCHLORIDE 1250 MG: 1.25 INJECTION, POWDER, LYOPHILIZED, FOR SOLUTION INTRAVENOUS at 09:08

## 2020-11-28 RX ADMIN — Medication 5 ML: at 06:00

## 2020-11-28 RX ADMIN — VANCOMYCIN HYDROCHLORIDE 1250 MG: 1.25 INJECTION, POWDER, LYOPHILIZED, FOR SOLUTION INTRAVENOUS at 00:19

## 2020-11-28 NOTE — PROGRESS NOTES
Blisters and skin tears on areas where heart monitor pads had been in contact with skin. Redness around the areas. Blisters weeping.

## 2020-11-28 NOTE — DISCHARGE SUMMARY
Physician Discharge Summary     Patient ID:    Logan Nunn  [id number]  38 y.o.  1980    Admit date: 11/26/2020    Discharge date : 11/28/2020    Chronic Diagnoses:    Problem List as of 11/28/2020 Date Reviewed: 11/26/2020          Codes Class Noted - Resolved    Shoulder pain ICD-10-CM: M25.519  ICD-9-CM: 719.41  11/27/2020 - Present        SIRS (systemic inflammatory response syndrome) ([facility] ICD-10-CM: R65.10  ICD-9-CM: 995.90  11/27/2020 - Present        * (Principal) DKA (diabetic ketoacidoses) ([facility] ICD-10-CM: E11.10  ICD-9-CM: 250.12  11/26/2020 - Present        DM (diabetes mellitus), type 1 ([facility] (Chronic) ICD-10-CM: E10.9  ICD-9-CM: 250.01  11/26/2020 - Present        Migraine (Chronic) ICD-10-CM: K16.702  ICD-9-CM: 346.90  11/26/2020 - Present        Anxiety (Chronic) ICD-10-CM: F41.9  ICD-9-CM: 300.00  11/26/2020 - Present        Vitamin D deficiency (Chronic) ICD-10-CM: E55.9  ICD-9-CM: 268.9  11/26/2020 - Present          22    Final Diagnoses:   DKA (diabetic ketoacidoses) ([facility] [E11.10]  SIRS (systemic inflammatory response syndrome) ([facility] [R65.10]  Shoulder pain [M25.519]    Reason for Hospitalization:  DKA and possible left shoulder postoperative infection  SIRS      Hospital Course: This is a very pleasant 44-year-old female with history of type 1 diabetes mellitus with insulin pump in place and generally good control. She presents in DKA. She recently had left shoulder arthroscopic debridement and lysis of adhesions for a frozen shoulder 1 week ago. She has had DKA 4 times in the past each time associated with infection she states. Started on DKA protocol, aggressively hydrated, insulin drip stopped after anion gap closed and her insulin pump resumed. Blood sugar subsequently as expected per course relatively well controlled. Potassium and magnesium repleted and followed.   In regards to her left shoulder recent surgery presenting with pain and a white count of 17,000 empirically started on antibiotics with vancomycin and ceftriaxone. Orthopedic surgery was consulted can [place] to concern for postoperative infection. Dr. Ollie Lew. His impression was that her physical findings were consistent with uncomplicated postoperative clinical course after her arthroscopic procedure. Patient was concerned about stopping antibiotics in lieu of orthopedics impression, after discussion with patient, decided to stop antibiotics, have a low threshold for return if you develop increasing pain, fever or chills and she should call her orthopedic surgeon Dr. Sharla Summers on Monday for follow-up next week rather than on 12/88 as previously planned. Incidentally, her white count/leukocytosis resolved and last WBC was 4.6. Systemic inflammatory response syndrome resolved. She is hemodynamically stable, tolerating her meals well, managing her insulin pump as before and she is ready to go home. Discharge Medications:   Current Discharge Medication List      START taking these medications    Details   insulin pump (PATIENT SUPPLIED) misc Resume prior to admission settings/recommendations  Qty: 1 Each, Refills: 0         CONTINUE these medications which have NOT CHANGED    Details   traZODone (DESYREL) 50 mg tablet Take  by mouth nightly. FLUoxetine (PROzac) 20 mg tablet Take 20 mg by mouth daily. topiramate (Topamax) 100 mg tablet Take  by mouth two (2) times a day. cyanocobalamin (Vitamin B-12) 2,500 mcg sublingual tablet Take 2,500 mcg by mouth daily. cholecalciferol (VITAMIN D3) (50,000 UNITS /1250 MCG) capsule Take  by mouth every seven (7) days. STOP taking these medications       insulin aspart protamine/insulin aspart (NovoLOG Mix 70-30 U-100 Insuln) 100 unit/mL (70-30) injection Comments:   Reason for Stopping: Follow up Care:    1. Madhu Gutierrez MD in 1-2 weeks. Please call to set up an appointment shortly after discharge.     2. Dr Sharla Summers ortho, call Monday as discussed for follow up next week. Diet:  Diabetic Diet    Disposition:  Home. Advanced Directive:   FULL x   DNR      Discharge Exam:  Patient Vitals for the past 12 hrs:   Temp Pulse Resp BP SpO2   11/28/20 1250 97.9 °F (36.6 °C) 66 18 130/74 95 %   11/28/20 0847 98.8 °F (37.1 °C) 65 18 124/75 99 %   11/28/20 0447 98.2 °F (36.8 °C) 76 18 111/66 99 %     General:  Alert, cooperative, no distress, appears stated age. Lungs:   Clear to auscultation bilaterally. Chest wall:  No tenderness or deformity. Heart:  Regular rate and rhythm, S1, S2 normal, no murmur, click, rub or gallop. Abdomen:   Soft, non-tender. Bowel sounds normal. No masses,  No organomegaly. Extremities: + pain rom left shoulder and tender to palp. No erythema   Pulses: 2+ and symmetric all extremities. Skin: Skin color, texture, turgor normal. No rashes or lesions   Neurologic: CNII-XII intact. No gross sensory or motor deficits         CONSULTATIONS: Orthopedic Surgery and Hospitalist    Significant Diagnostic Studies:   11/26/2020: BUN 13 mg/dL (Ref range: 6 - 20 mg/dL); BUN 11 mg/dL (Ref range: 6 - 20 mg/dL); BUN 11 mg/dL (Ref range: 6 - 20 mg/dL); BUN 10 mg/dL (Ref range: 6 - 20 mg/dL); Calcium 8.9 mg/dL (Ref range: 8.5 - 10.1 mg/dL); Calcium 8.6 mg/dL (Ref range: 8.5 - 10.1 mg/dL); Calcium 8.0 mg/dL* (Ref range: 8.5 - 10.1 mg/dL); Calcium 8.1 mg/dL* (Ref range: 8.5 - 10.1 mg/dL); CO2 13 mmol/L* (Ref range: 21 - 32 mmol/L); CO2 15 mmol/L* (Ref range: 21 - 32 mmol/L); CO2 10 mmol/L* (Ref range: 21 - 32 mmol/L); CO2 9 mmol/L* (Ref range: 21 - 32 mmol/L); Creatinine 0.93 mg/dL (Ref range: 0.55 - 1.02 mg/dL); Creatinine 0.79 mg/dL (Ref range: 0.55 - 1.02 mg/dL); Creatinine 0.62 mg/dL (Ref range: 0.55 - 1.02 mg/dL); Creatinine 0.64 mg/dL (Ref range: 0.55 - 1.02 mg/dL); Glucose 335 mg/dL* (Ref range: 65 - 100 mg/dL); Glucose 182 mg/dL* (Ref range: 65 - 100 mg/dL);  Glucose 292 mg/dL* (Ref range: 65 - 100 mg/dL); Glucose 292 mg/dL* (Ref range: 65 - 100 mg/dL); HCT 38.5 % (Ref range: 35.0 - 47.0 %); HGB 12.3 g/dL (Ref range: 11.5 - 16.0 g/dL); Potassium 4.3 mmol/L (Ref range: 3.5 - 5.1 mmol/L); Potassium 3.9 mmol/L (Ref range: 3.5 - 5.1 mmol/L); Potassium 4.0 mmol/L (Ref range: 3.5 - 5.1 mmol/L); Potassium 4.1 mmol/L (Ref range: 3.5 - 5.1 mmol/L); Sodium 135 mmol/L* (Ref range: 136 - 145 mmol/L); Sodium 138 mmol/L (Ref range: 136 - 145 mmol/L); Sodium 140 mmol/L (Ref range: 136 - 145 mmol/L); Sodium 140 mmol/L (Ref range: 136 - 145 mmol/L)  11/27/2020: BUN 9 mg/dL (Ref range: 6 - 20 mg/dL); BUN 8 mg/dL (Ref range: 6 - 20 mg/dL); BUN 7 mg/dL (Ref range: 6 - 20 mg/dL); BUN 7 mg/dL (Ref range: 6 - 20 mg/dL); BUN 7 mg/dL (Ref range: 6 - 20 mg/dL); BUN 6 mg/dL (Ref range: 6 - 20 mg/dL); BUN 6 mg/dL (Ref range: 6 - 20 mg/dL); Calcium 7.8 mg/dL* (Ref range: 8.5 - 10.1 mg/dL); Calcium 8.4 mg/dL* (Ref range: 8.5 - 10.1 mg/dL); Calcium 7.9 mg/dL* (Ref range: 8.5 - 10.1 mg/dL); Calcium 7.9 mg/dL* (Ref range: 8.5 - 10.1 mg/dL); Calcium 8.1 mg/dL* (Ref range: 8.5 - 10.1 mg/dL); Calcium 7.7 mg/dL* (Ref range: 8.5 - 10.1 mg/dL); Calcium 7.8 mg/dL* (Ref range: 8.5 - 10.1 mg/dL); CO2 12 mmol/L* (Ref range: 21 - 32 mmol/L); CO2 13 mmol/L* (Ref range: 21 - 32 mmol/L); CO2 14 mmol/L* (Ref range: 21 - 32 mmol/L); CO2 14 mmol/L* (Ref range: 21 - 32 mmol/L); CO2 15 mmol/L* (Ref range: 21 - 32 mmol/L); CO2 16 mmol/L* (Ref range: 21 - 32 mmol/L); CO2 16 mmol/L* (Ref range: 21 - 32 mmol/L); Creatinine 0.71 mg/dL (Ref range: 0.55 - 1.02 mg/dL); Creatinine 0.72 mg/dL (Ref range: 0.55 - 1.02 mg/dL); Creatinine 0.72 mg/dL (Ref range: 0.55 - 1.02 mg/dL); Creatinine 0.65 mg/dL (Ref range: 0.55 - 1.02 mg/dL); Creatinine 0.68 mg/dL (Ref range: 0.55 - 1.02 mg/dL); Creatinine 0.64 mg/dL (Ref range: 0.55 - 1.02 mg/dL); Creatinine 0.54 mg/dL* (Ref range: 0.55 - 1.02 mg/dL); Glucose 213 mg/dL* (Ref range: 65 - 100 mg/dL);  Glucose 156 mg/dL* (Ref range: 65 - 100 mg/dL); Glucose 72 mg/dL (Ref range: 65 - 100 mg/dL); Glucose 148 mg/dL* (Ref range: 65 - 100 mg/dL); Glucose 147 mg/dL* (Ref range: 65 - 100 mg/dL); Glucose 283 mg/dL* (Ref range: 65 - 100 mg/dL); Glucose 120 mg/dL* (Ref range: 65 - 100 mg/dL); HCT 30.7 %* (Ref range: 35.0 - 47.0 %); HGB 10.0 g/dL* (Ref range: 11.5 - 16.0 g/dL); Potassium 4.0 mmol/L (Ref range: 3.5 - 5.1 mmol/L); Potassium 3.9 mmol/L (Ref range: 3.5 - 5.1 mmol/L); Potassium 3.9 mmol/L (Ref range: 3.5 - 5.1 mmol/L); Potassium 3.7 mmol/L (Ref range: 3.5 - 5.1 mmol/L); Potassium 3.6 mmol/L (Ref range: 3.5 - 5.1 mmol/L); Potassium 4.2 mmol/L (Ref range: 3.5 - 5.1 mmol/L); Potassium 3.3 mmol/L* (Ref range: 3.5 - 5.1 mmol/L); Sodium 143 mmol/L (Ref range: 136 - 145 mmol/L); Sodium 142 mmol/L (Ref range: 136 - 145 mmol/L); Sodium 145 mmol/L (Ref range: 136 - 145 mmol/L); Sodium 144 mmol/L (Ref range: 136 - 145 mmol/L); Sodium 145 mmol/L (Ref range: 136 - 145 mmol/L); Sodium 144 mmol/L (Ref range: 136 - 145 mmol/L); Sodium 144 mmol/L (Ref range: 136 - 145 mmol/L)  Recent Labs     11/28/20  0300 11/27/20  1327   WBC 4.6 10.4   HGB 8.8* 10.0*   HCT 27.3* 30.7*    303     Recent Labs     11/28/20  0850 11/28/20  0800 11/28/20  0300 11/27/20  2105 11/27/20  2100  11/26/20  1400     --  144  --  144   < > 138   K 3.3*  --  3.1*  --  3.3*   < > 3.9   *  --  119*  --  119*   < > 114*   CO2 17*  --  17*  --  16*   < > 15*   BUN 5*  --  5*  --  6   < > 11   CREA 0.61  --  0.45*  --  0.54*   < > 0.79   *  --  155*  --  120*   < > 182*   CA 7.8*  --  7.7*  --  7.8*   < > 8.6   MG  --  1.9 2.0 2.0  --    < > 2.2   PHOS  --   --   --   --   --   --  2.3*    < > = values in this interval not displayed. Recent Labs     11/26/20  1158   ALT 42   *   TBILI 0.6   TP 8.1   ALB 4.0   GLOB 4.1*     No results for input(s): INR, PTP, APTT, INREXT in the last 72 hours. No results for input(s): FE, TIBC, PSAT, FERR in the last 72 hours.    No results for input(s): PH, PCO2, PO2 in the last 72 hours. No results for input(s): CPK, CKMB in the last 72 hours.     No lab exists for component: TROPONINI  Lab Results   Component Value Date/Time    Glucose (POC) 206 (H) 11/28/2020 12:52 PM    Glucose (POC) 192 (H) 11/28/2020 08:49 AM    Glucose (POC) 128 (H) 11/27/2020 08:04 PM    Glucose (POC) 143 (H) 11/27/2020 05:14 PM    Glucose (POC) 143 (H) 11/27/2020 03:58 PM           Signed:  Roshan Thomason MD  11/28/2020  2:10 PM    Tnie 30 minutes

## 2020-11-28 NOTE — PROGRESS NOTES
Discharge instructions reviewed with patient, no further questions at this time. Patient instructed to call nurse when  arrives to pick her up. Will take patient downstairs by wheelchair. Patient with no signs or symptoms of distress.

## 2020-12-03 LAB
BACTERIA SPEC CULT: NORMAL
SPECIAL REQUESTS,SREQ: NORMAL

## 2020-12-08 ENCOUNTER — HOSPITAL ENCOUNTER (EMERGENCY)
Age: 40
Discharge: HOME | End: 2020-12-08
Attending: INTERNAL MEDICINE
Payer: OTHER GOVERNMENT

## 2020-12-08 VITALS
BODY MASS INDEX: 28.32 KG/M2 | HEART RATE: 87 BPM | HEIGHT: 61 IN | DIASTOLIC BLOOD PRESSURE: 82 MMHG | SYSTOLIC BLOOD PRESSURE: 134 MMHG | OXYGEN SATURATION: 100 % | TEMPERATURE: 98.4 F | RESPIRATION RATE: 17 BRPM | WEIGHT: 150 LBS

## 2020-12-08 DIAGNOSIS — I80.8 SUPERFICIAL THROMBOPHLEBITIS OF LEFT UPPER EXTREMITY: Primary | ICD-10-CM

## 2020-12-08 LAB
ALBUMIN SERPL-MCNC: 3.9 G/DL (ref 3.5–5)
ALBUMIN/GLOB SERPL: 1 {RATIO} (ref 1.1–2.2)
ALP SERPL-CCNC: 121 U/L (ref 45–117)
ALT SERPL-CCNC: 38 U/L (ref 12–78)
ANION GAP SERPL CALC-SCNC: 4 MMOL/L (ref 5–15)
AST SERPL W P-5'-P-CCNC: 24 U/L (ref 15–37)
BASOPHILS # BLD: 0 K/UL (ref 0–0.1)
BASOPHILS NFR BLD: 1 % (ref 0–1)
BILIRUB SERPL-MCNC: 0.3 MG/DL (ref 0.2–1)
BUN SERPL-MCNC: 10 MG/DL (ref 6–20)
BUN/CREAT SERPL: 14 (ref 12–20)
CA-I BLD-MCNC: 8.8 MG/DL (ref 8.5–10.1)
CHLORIDE SERPL-SCNC: 111 MMOL/L (ref 97–108)
CO2 SERPL-SCNC: 25 MMOL/L (ref 21–32)
CREAT SERPL-MCNC: 0.69 MG/DL (ref 0.55–1.02)
DIFFERENTIAL METHOD BLD: ABNORMAL
EOSINOPHIL # BLD: 0.2 K/UL (ref 0–0.4)
EOSINOPHIL NFR BLD: 4 % (ref 0–7)
ERYTHROCYTE [DISTWIDTH] IN BLOOD BY AUTOMATED COUNT: 15.4 % (ref 11.5–14.5)
GLOBULIN SER CALC-MCNC: 3.9 G/DL (ref 2–4)
GLUCOSE SERPL-MCNC: 96 MG/DL (ref 65–100)
HCT VFR BLD AUTO: 35.9 % (ref 35–47)
HGB BLD-MCNC: 11.6 G/DL (ref 11.5–16)
IMM GRANULOCYTES # BLD AUTO: 0 K/UL (ref 0–0.04)
IMM GRANULOCYTES NFR BLD AUTO: 0 % (ref 0–0.5)
LYMPHOCYTES # BLD: 2.3 K/UL (ref 0.8–3.5)
LYMPHOCYTES NFR BLD: 43 % (ref 12–49)
MCH RBC QN AUTO: 28.8 PG (ref 26–34)
MCHC RBC AUTO-ENTMCNC: 32.3 G/DL (ref 30–36.5)
MCV RBC AUTO: 89.1 FL (ref 80–99)
MONOCYTES # BLD: 0.3 K/UL (ref 0–1)
MONOCYTES NFR BLD: 6 % (ref 5–13)
NEUTS SEG # BLD: 2.4 K/UL (ref 1.8–8)
NEUTS SEG NFR BLD: 46 % (ref 32–75)
PLATELET # BLD AUTO: 342 K/UL (ref 150–400)
PMV BLD AUTO: 11.7 FL (ref 8.9–12.9)
POTASSIUM SERPL-SCNC: 4 MMOL/L (ref 3.5–5.1)
PROT SERPL-MCNC: 7.8 G/DL (ref 6.4–8.2)
RBC # BLD AUTO: 4.03 M/UL (ref 3.8–5.2)
SODIUM SERPL-SCNC: 140 MMOL/L (ref 136–145)
WBC # BLD AUTO: 5.3 K/UL (ref 3.6–11)

## 2020-12-08 PROCEDURE — 80053 COMPREHEN METABOLIC PANEL: CPT

## 2020-12-08 PROCEDURE — 99283 EMERGENCY DEPT VISIT LOW MDM: CPT

## 2020-12-08 PROCEDURE — 36415 COLL VENOUS BLD VENIPUNCTURE: CPT

## 2020-12-08 PROCEDURE — 74011250637 HC RX REV CODE- 250/637: Performed by: INTERNAL MEDICINE

## 2020-12-08 PROCEDURE — 85025 COMPLETE CBC W/AUTO DIFF WBC: CPT

## 2020-12-08 RX ORDER — CEPHALEXIN 500 MG/1
500 CAPSULE ORAL
Status: COMPLETED | OUTPATIENT
Start: 2020-12-08 | End: 2020-12-08

## 2020-12-08 RX ORDER — RIVAROXABAN 15 MG-20MG
KIT ORAL
Qty: 1 DOSE PACK | Refills: 0 | Status: SHIPPED | OUTPATIENT
Start: 2020-12-08 | End: 2021-01-29

## 2020-12-08 RX ORDER — CEPHALEXIN 500 MG/1
500 CAPSULE ORAL 3 TIMES DAILY
Qty: 21 CAP | Refills: 0 | Status: SHIPPED | OUTPATIENT
Start: 2020-12-08 | End: 2020-12-15

## 2020-12-08 RX ADMIN — CEPHALEXIN 500 MG: 500 CAPSULE ORAL at 21:53

## 2020-12-09 NOTE — ED TRIAGE NOTES
Patient states she was here on thanksgiving for DKA and had multiple IVs on the right arm, since then she has had pain in the right arm that has increased and is now having swelling and red streaks and warmth to the arm.

## 2020-12-09 NOTE — ED PROVIDER NOTES
HPI   Chief complaint: Sore knots on the right forearm. The patient was here in DKA on Thanksgiving and had multiple IVs in that arm. Onset when: Worsening over the last few days  Insidious or sudden: Insidious  Duration: Present  Location: Forearm   Quality: Soreness  Severity: Moderate  Increased by: Patient  Decreased by: Nothing  Past Medical History:   Diagnosis Date    Anxiety     Autoimmune disease ([address]     Bloating     Depression     Diabetes ([address]     Type I     Diarrhea     DKA (diabetic ketoacidoses) ([address] 11/26/2020    DM (diabetes mellitus), type 1 ([address] 11/26/2020    Fatigue     Headache     Kidney stones     Loss of appetite     Migraine 11/26/2020    Nausea        Past Surgical History:   Procedure Laterality Date    HX CHOLECYSTECTOMY  2018    Dr. Valentín Suarez  2048    Dr. Frantz [facility])   [address], [address]   [facility]  2017    Uterine Ablation          History reviewed. No pertinent family history. Social History     Socioeconomic History    Marital status:      Spouse name: Not on file    Number of children: Not on file    Years of education: Not on file    Highest education level: Not on file   Occupational History    Not on file   Social Needs    Financial resource strain: Not on file    Food insecurity     Worry: Not on file     Inability: Not on file    Transportation needs     Medical: Not on file     Non-medical: Not on file   Tobacco Use    Smoking status: Never Smoker    Smokeless tobacco: Never Used   Substance and Sexual Activity    Alcohol use: Not Currently    Drug use: Never    Sexual activity: Yes     Partners: Male   Lifestyle    Physical activity     Days per week: Patient refused     Minutes per session: Patient refused    Stress:  To some extent   Relationships    Social connections     Talks on phone: Patient refused     Gets together: Patient refused     Attends Lutheran service: Patient refused Active member of club or organization: Patient refused     Attends meetings of clubs or organizations: Patient refused     Relationship status: Patient refused    Intimate partner violence     Fear of current or ex partner: No     Emotionally abused: No     Physically abused: No     Forced sexual activity: No   Other Topics Concern     Service Not Asked    Blood Transfusions Not Asked    Caffeine Concern Not Asked    Occupational Exposure Not Asked    Hobby Hazards Not Asked    Sleep Concern Not Asked    Stress Concern Not Asked    Weight Concern Not Asked    Special Diet Not Asked    Back Care Not Asked    Exercise Not Asked    Bike Helmet Not Asked   [address] [facility] [facility] Not Asked    Self-Exams Not Asked   Social History Narrative    Not on file         ALLERGIES: Adhesive tape-silicones    Review of Systems  General: No fever. Musculoskeletal: Right forearm pain    Vitals:    12/08/20 1916   BP: 134/82   Pulse: 87   Resp: 17   Temp: 98.4 °F (36.9 °C)   SpO2: 100%   Weight: 68 kg (150 lb)   Height: 5' 1\" (1.549 m)            Physical Exam   General: Well developed, well nourished patient in NAD  Skin/Derm: Skin is warm and dry. MSK: Left forearm with hard tender veins palpable in the forearm, below the antecubital fossa. There is no left arm swelling noted. There is some erythema of the skin overlying the veins. Neuro: Alert.   Psych: Affect is normal.  Non-anxious.    ------------------------------  Nurses Notes  Reviewed  ------------------------------  Labs  Recent Results (from the past 8 hour(s))   CBC WITH AUTOMATED DIFF    Collection Time: 12/08/20  7:30 PM   Result Value Ref Range    WBC 5.3 3.6 - 11.0 K/uL    RBC 4.03 3.80 - 5.20 M/uL    HGB 11.6 11.5 - 16.0 g/dL    HCT 35.9 35.0 - 47.0 %    MCV 89.1 80.0 - 99.0 FL    MCH 28.8 26.0 - 34.0 PG    MCHC 32.3 30.0 - 36.5 g/dL    RDW 15.4 (H) 11.5 - 14.5 %    PLATELET 463 572 - 098 K/uL    MPV 11.7 8.9 - 12.9 FL    NEUTROPHILS 46 32 - 75 % LYMPHOCYTES 43 12 - 49 %    MONOCYTES 6 5 - 13 %    EOSINOPHILS 4 0 - 7 %    BASOPHILS 1 0 - 1 %    IMMATURE GRANULOCYTES 0 0.0 - 0.5 %    ABS. NEUTROPHILS 2.4 1.8 - 8.0 K/UL    ABS. LYMPHOCYTES 2.3 0.8 - 3.5 K/UL    ABS. MONOCYTES 0.3 0.0 - 1.0 K/UL    ABS. EOSINOPHILS 0.2 0.0 - 0.4 K/UL    ABS. BASOPHILS 0.0 0.0 - 0.1 K/UL    ABS. IMM. GRANS. 0.0 0.00 - 0.04 K/UL    DF AUTOMATED     METABOLIC PANEL, COMPREHENSIVE    Collection Time: 12/08/20  7:30 PM   Result Value Ref Range    Sodium 140 136 - 145 mmol/L    Potassium 4.0 3.5 - 5.1 mmol/L    Chloride 111 (H) 97 - 108 mmol/L    CO2 25 21 - 32 mmol/L    Anion gap 4 (L) 5 - 15 mmol/L    Glucose 96 65 - 100 mg/dL    BUN 10 6 - 20 mg/dL    Creatinine 0.69 0.55 - 1.02 mg/dL    BUN/Creatinine ratio 14 12 - 20      GFR est AA >60 >60 ml/min/1.73m2    GFR est non-AA >60 >60 ml/min/1.73m2    Calcium 8.8 8.5 - 10.1 mg/dL    Bilirubin, total 0.3 0.2 - 1.0 mg/dL    AST (SGOT) 24 15 - 37 U/L    ALT (SGPT) 38 12 - 78 U/L    Alk.  phosphatase 121 (H) 45 - 117 U/L    Protein, total 7.8 6.4 - 8.2 g/dL    Albumin 3.9 3.5 - 5.0 g/dL    Globulin 3.9 2.0 - 4.0 g/dL    A-G Ratio 1.0 (L) 1.1 - 2.2         ------------------------------  Diagnosis  Superficial thrombophlebitis  ------------------------------  Disposition  Discharge  ------------------------------  Plan  Aspirin 325 mg 1 orally once a day  Keflex 500 mg orally 3 times a day for 1 week  Warm compresses to the affected area for at least 5 minutes 4 times a day.  ------------------------------

## 2020-12-18 ENCOUNTER — OFFICE VISIT (OUTPATIENT)
Dept: SURGERY | Age: 40
End: 2020-12-18
Payer: OTHER GOVERNMENT

## 2020-12-18 VITALS
BODY MASS INDEX: 28.89 KG/M2 | HEART RATE: 80 BPM | HEIGHT: 61 IN | TEMPERATURE: 98.3 F | RESPIRATION RATE: 16 BRPM | OXYGEN SATURATION: 98 % | DIASTOLIC BLOOD PRESSURE: 70 MMHG | WEIGHT: 153 LBS | SYSTOLIC BLOOD PRESSURE: 110 MMHG

## 2020-12-18 DIAGNOSIS — K28.9 MARGINAL ULCER: Primary | ICD-10-CM

## 2020-12-18 PROCEDURE — 99214 OFFICE O/P EST MOD 30 MIN: CPT | Performed by: SURGERY

## 2020-12-18 RX ORDER — OMEPRAZOLE 40 MG/1
40 CAPSULE, DELAYED RELEASE ORAL 2 TIMES DAILY
Qty: 60 CAP | Refills: 3 | Status: SHIPPED | OUTPATIENT
Start: 2020-12-18 | End: 2021-01-29

## 2020-12-18 RX ORDER — SUCRALFATE 1 G/1
1 TABLET ORAL 4 TIMES DAILY
COMMUNITY

## 2020-12-18 RX ORDER — SUCRALFATE 1 G/1
1 TABLET ORAL
Qty: 120 TAB | Refills: 3 | Status: SHIPPED | OUTPATIENT
Start: 2020-12-18 | End: 2021-01-17

## 2020-12-18 RX ORDER — PANTOPRAZOLE SODIUM 40 MG/1
40 TABLET, DELAYED RELEASE ORAL 2 TIMES DAILY
COMMUNITY

## 2020-12-18 NOTE — LETTER
12/18/2020 Patient: Rolo Mayberry YOB: 1980 Date of Visit: 12/18/2020 Suman Friedman MD 
[address] [address] [address] [facility] [address] [address] Fax: [phone] Dear Suman Friedman MD, Thank you for referring Ms. Shasha Bacon to [facility] for evaluation. My notes for this consultation are attached. If you have questions, please do not hesitate to call me. I look forward to following your patient along with you.  
 
 
Sincerely, 
 
Lien Cheng MD

## 2020-12-18 NOTE — PROGRESS NOTES
12 pages were faxed to Dr. Daren Lockett [phone]. Confirmation received. All info put in scan pile.

## 2020-12-18 NOTE — PROGRESS NOTES
General Surgery Office Consultation / H & P    CC: Epigastric pain  History of Present Illness:      Margaret Krishnamurthy is a 38 y.o. female who presents with epigastric pain. 76-year-old female who is 2 years out from a Benito-en-Y gastric bypass done in [place]. 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 She is also had multiple issues with ulcers that has responded to PPI and Carafate. Over this past weekend she developed recurrent epigastric pain and started PPI back. He is currently taking Protonix 40 twice daily and Carafate 4 times daily. She experiences moderate improvement with this. He currently experiences epigastric pain that is 8 out of 10 and dull in her epigastrium. Worse with food. No radiation. She also reports that her grandfather had a history of gastric ulcers and had to have a gastric resection for this. She denies being around tobacco or using tobacco, she denies NSAID use, she denies steroid use. Past Medical History:   Diagnosis Date    Anxiety     Autoimmune disease ([address]     Bloating     Depression     Diabetes ([address]     Type I     Diarrhea     DKA (diabetic ketoacidoses) ([address] 11/26/2020    DM (diabetes mellitus), type 1 ([address] 11/26/2020    Fatigue     Headache     Kidney stones     Loss of appetite     Migraine 11/26/2020    Nausea      Past Surgical History:   Procedure Laterality Date    HX CHOLECYSTECTOMY  2018    Dr. Jacinda Garcia  2048    Dr. Medina Breed [facility])   [address], [address]   [facility]  2017    Uterine Ablation     HX ORTHOPAEDIC  11/18/2020    frozen shoulder -left -repair      No family history on file.   Social History     Socioeconomic History    Marital status:      Spouse name: Not on file    Number of children: Not on file    Years of education: Not on file    Highest education level: Not on file   Tobacco Use    Smoking status: Never Smoker    Smokeless tobacco: Never Used   Substance and Sexual Activity    Alcohol use: Not Currently    Drug use: Never    Sexual activity: Yes     Partners: Male   Lifestyle    Physical activity     Days per week: Patient refused     Minutes per session: Patient refused    Stress: To some extent   Relationships    Social connections     Talks on phone: Patient refused     Gets together: Patient refused     Attends Yazidi service: Patient refused     Active member of club or organization: Patient refused     Attends meetings of clubs or organizations: Patient refused     Relationship status: Patient refused   Other Topics Concern      Prior to Admission medications    Medication Sig Start Date End Date Taking? Authorizing Provider   sucralfate (CARAFATE) 1 gram tablet Take 1 g by mouth four (4) times daily. Yes Provider, Historical   pantoprazole (PROTONIX) 40 mg tablet Take 40 mg by mouth daily. [place] 12/14/2020 2 tablets a day for 1 week then take 1 tablet a day   Yes Provider, Historical   insulin pump (PATIENT SUPPLIED) misc Resume prior to admission settings/recommendations 11/28/20  Yes Ricardo Ibarra MD   traZODone (DESYREL) 50 mg tablet Take  by mouth nightly. Yes Provider, Historical   FLUoxetine (PROzac) 20 mg tablet Take 20 mg by mouth daily. Yes Provider, Historical   topiramate (Topamax) 100 mg tablet Take  by mouth two (2) times a day. Yes Provider, Historical   cyanocobalamin (Vitamin B-12) 2,500 mcg sublingual tablet Take 2,500 mcg by mouth daily. Yes Provider, Historical   cholecalciferol (VITAMIN D3) (50,000 UNITS /1250 MCG) capsule Take  by mouth every seven (7) days. Yes Provider, Historical   rivaroxaban (Xarelto DVT-PE Treat 30d Start) 15 mg (42)- 20 mg (9) DsPk Take one 15 mg tablet twice a day with food for the first 21 days. Then, take one 20 mg tablet once a day with food for 9 days.  12/8/20   Indiana Burks MD     Allergies   Allergen Reactions    Adhesive Tape-Silicones Contact Dermatitis     Erythema        Review of Systems:  Constitutional: No fever or chills  Neurologic: No headache  Eyes: No scleral icterus or irritated eyes  Nose: No nasal pain or drainage  Mouth: No oral lesions or sore throat  Cardiac: No palpations or chest pain  Pulmonary: No cough or shortness or breath  Gastrointestinal: Epigastric pain  Genitourinary: No dysuria  Musculoskeletal: No muscle or joint tenderness  Skin: No rashes or lesions  Psychiatric: No anxiety or depressed mood    Physical Exam:     Visit Vitals  /70 (BP 1 Location: Left arm, BP Patient Position: Sitting)   Pulse 80   Temp 98.3 °F (36.8 °C) (Oral)   Resp 16   Ht 5' 1\" (1.549 m)   Wt 153 lb (69.4 kg)   SpO2 98%   BMI 28.91 kg/m²     General: No acute distress, conversant  Eyes: PERRLA, no scleral icterus  HENT: Normocephalic without oral lesions  Neck: Trachea midline without LAD  Cardiac: Normal pulse rate and rhythm  Pulmonary: Symmetric chest rise with normal effort  GI: Soft, mild tenderness in epigastrium, ND, no hernia, no splenomegaly  Skin: Warm without rash  Extremities: No edema or joint stiffness  Psych: Appropriate mood and affect    Assessment:     50-year-old female with likely recurrent marginal ulcer after Benito-en-Y gastric bypass done in [address] [place] [address] St:     Patient will be referred to Dr. Sheryll Nyhan urgent EGD. She is to continue her PPI 40 twice a day and Carafate. Continue regimen for at least 3 months. May need repeat EGD at that time. Given her recurrent issues she likely needs prolonged PPI therapy after the ulcer has healed. Does not currently need a revision, however, if she fails medical therapy she may need to have a vagotomy and possible GJ revision. She will follow up with Paul in 3 weeks, after her EGD and see how she is doing. Total time involved with this patient's care was: 30 minutes, of which >50% of the time was spent counciling the patient.     Signed By: Moreno Souza MD  Bariatric and General Surgeon  [facility]    December 18, 2020

## 2020-12-18 NOTE — PROGRESS NOTES
1. Have you been to the ER, urgent care clinic since your last visit? Hospitalized since your last visit? ER on 11/26/2020 fo DKA  And on 12/8/2020 right forearm cellulitis and blood clot - in [facility] care    2. Have you seen or consulted any other health care providers outside of the [address] [place] [address] since your last visit? Include any pap smears or colon screening. No    C/O Pain after eating.

## 2020-12-23 ENCOUNTER — TELEPHONE (OUTPATIENT)
Dept: SURGERY | Age: 40
End: 2020-12-23

## 2020-12-23 NOTE — TELEPHONE ENCOUNTER
Patient called and stated that she is having a procedure with Dr Omid Greene on 12/30/20. Patient stated that there is an issue with the referral for .  is requesting additional information. She stated that if they do not get the information by Monday she will have to cancel the procedure.

## 2020-12-23 NOTE — TELEPHONE ENCOUNTER
Patient identified with two patient identifiers. Patient states GI informed her she needs to get referral from PCP and she contacted them and they informed her she needed to contact us because we are the referring providers. Patient informed I will contact Home and follow up with her shortly. Home contacted informed I will need to print referral/auth provider form complete and fax to [address] [place] [address] at [phone]. Rep directed me in [email or web address] to print form.

## 2020-12-24 ENCOUNTER — TELEPHONE (OUTPATIENT)
Dept: SURGERY | Age: 40
End: 2020-12-24

## 2020-12-24 NOTE — TELEPHONE ENCOUNTER
Patient called and would like a call back from the nurse regarding an issue with her referral for an EGD.

## 2020-12-24 NOTE — TELEPHONE ENCOUNTER
Spoke with Home again to get an update on referral status. Per rep still pending. Could result later this afternoon or by Monday morning. Per rep PCP should have placed referral if they would have submitted it for GI under urgent it would have already resulted. Informed them patient did contact them for the referral and the referred her back to us since we requested the EGD. Informed them PCP office is currently closed. Patient informed of above will follow up on Monday.

## 2020-12-28 NOTE — TELEPHONE ENCOUNTER
Patient identified with two patient identifiers. Patient informed per  they cannot take referral request from us this has to be submitted to them from the PCN. Patient informed I did try to call them on Thursday to request but office was already closed. Per patient she has already sent email to provider to submit. Instructed patient to have them submit as urgent per  it will be reviewed and processed faster. Patient agreed to return call if any other questions or concerns.

## 2021-01-08 ENCOUNTER — OFFICE VISIT (OUTPATIENT)
Dept: SURGERY | Age: 41
End: 2021-01-08
Payer: OTHER GOVERNMENT

## 2021-01-08 VITALS
SYSTOLIC BLOOD PRESSURE: 110 MMHG | WEIGHT: 151 LBS | DIASTOLIC BLOOD PRESSURE: 74 MMHG | OXYGEN SATURATION: 98 % | HEART RATE: 69 BPM | HEIGHT: 61 IN | TEMPERATURE: 98.6 F | BODY MASS INDEX: 28.51 KG/M2 | RESPIRATION RATE: 17 BRPM

## 2021-01-08 DIAGNOSIS — R10.13 EPIGASTRIC PAIN: Primary | ICD-10-CM

## 2021-01-08 PROCEDURE — 99213 OFFICE O/P EST LOW 20 MIN: CPT | Performed by: NURSE PRACTITIONER

## 2021-01-08 RX ORDER — METOCLOPRAMIDE 10 MG/1
10 TABLET ORAL
Qty: 40 TAB | Refills: 0 | Status: SHIPPED | OUTPATIENT
Start: 2021-01-08 | End: 2021-01-18

## 2021-01-08 RX ORDER — HYOSCYAMINE SULFATE 0.12 MG/1
0.12 TABLET SUBLINGUAL
Qty: 30 TAB | Refills: 0 | Status: SHIPPED | OUTPATIENT
Start: 2021-01-08 | End: 2022-02-08 | Stop reason: ALTCHOICE

## 2021-01-08 NOTE — PROGRESS NOTES
1. Have you been to the ER, urgent care clinic since your last visit? Hospitalized since your last visit? No    2. Have you seen or consulted any other health care providers outside of the [address] [place] [address] since your last visit? Include any pap smears or colon screening.  No

## 2021-01-08 NOTE — PATIENT INSTRUCTIONS
Upper GI Series: About This Test 
What is it? An upper gastrointestinal (GI) series looks at the upper and middle sections of the gastrointestinal tract. The test uses barium contrast material, fluoroscopy, and X-ray. Fluoroscopy is a kind of X-ray. Why is this test done? An upper GI series is done to find the cause of symptoms such as vomiting, burping up food, trouble swallowing, poor weight gain, bleeding, or belly pain. It's used to find narrow spots or blockages in the upper intestinal tract. The test can also find ulcers, polyps, and pyloric stenosis. How do you prepare for the test? 
You may have to eat a low-fiber diet for a few days before the test, stop eating for 12 hours before the test, or both. You may also need to take a laxative to help clean out your intestines the evening before the test and stop taking certain medicines. How is the test done? · You will need to take off your clothes and put on a hospital gown. · Take out any dentures, and take off any jewelry. · You will lie on your back on an X-ray table. · You will have an X-ray taken before you drink the barium mix. Then you'll take small swallows repeatedly during the series of X-rays that follow. · The doctor watches the barium pass through your GI tract using fluoroscopy and X-ray pictures. The table is tilted at different positions, and you may change positions to help spread the barium. · You may be given a laxative or enema to flush the barium out of your intestines after the test to prevent constipation. How long does the test take? The test will take about 30 to 40 minutes. If you are also having a small bowel study, the test will take 2 to 6 hours. What happens after the test? 
· You will probably be able to go home right away. Results of the test are usually ready in 1 to 3 days. · You can go back to your usual activities right away. You may eat and drink whatever you like, unless your doctor tells you not to. It's a good idea to drink a lot of fluids for a few days to flush out the barium. · For 1 to 3 days after the test, your stool (feces) will look white from the barium. · If the barium stays in your intestine, it can harden and cause a blockage. If you get constipated, you may need to use a laxative to pass a stool. · In some cases, you may be asked to come back after 24 hours to have more X-rays taken. When should you call for help? Watch closely for changes in your health, and be sure to contact your doctor if: 
· You aren't able to have a bowel movement in 2 to 3 days after the test. 
Follow-up care is a key part of your treatment and safety. Be sure to make and go to all appointments, and call your doctor if you are having problems. It's also a good idea to keep a list of the medicines you take. Ask your doctor when you can expect to have your test results. Where can you learn more? Go to http://www.gray.com/ Enter J416 in the search box to learn more about \"Upper GI Series: About This Test.\" Current as of: December 9, 2019               Content Version: 12.6 © 1559-6241 [employer]. Care instructions adapted under license by [employer] (which disclaims liability or warranty for this information). If you have questions about a medical condition or this instruction, always ask your healthcare professional. [address] any warranty or liability for your use of this information.

## 2021-01-08 NOTE — PROGRESS NOTES
HISTORY OF PRESENT ILLNESS  Leni Hall is a 23-year-old female approximately 2 years out from Benito-en-Y gastric bypass at [place] by Dr. Eagle Rater complicated by multiple strictures, dilation and candycane syndrome. After her last visit with me in November, she had shoulder surgery on her left shoulder. She states that she developed epigastric pain after her left shoulder surgery. She denies taking NSAIDs postoperatively. She was seen by Dr. Sarah Lindsay in December, for epigastric pain. She was then referred for endoscopy. Her endoscopy was completed January 6, 2021 which showed the following:  -Z-line irregular, 35 cm with the incisors biopsied  -Normal mucosa was found at the entire esophagus. Biopsied  -Abnormal esophageal motility  -Small hiatal hernia  -Gastric bypass with small sized pouch and intact staple line. Gastrojejunal anastomosis characterized by healthy-appearing mucosa  -Normal mucosa was found in the cardia and in the gastric fundus. Biopsied    She presents today for follow-up. She is taking Protonix 40 mg twice daily and Carafate 4 times daily without relief. She complains of pain with water and with food. She is eating 1 time a day, in the evening. She is eating soft foods like banana and oatmeal.  She has lost 9 pounds since November. HPI    Review of Systems   Respiratory: Negative for shortness of breath. Cardiovascular: Negative for chest pain. Gastrointestinal: Positive for abdominal pain and heartburn. Negative for nausea and vomiting. Physical Exam  Constitutional:       Appearance: She is well-developed. HENT:      Mouth/Throat:      Mouth: Mucous membranes are moist.   Neck:      Musculoskeletal: Normal range of motion and neck supple. Cardiovascular:      Rate and Rhythm: Normal rate and regular rhythm. Heart sounds: No murmur. No friction rub. No gallop. Pulmonary:      Effort: Pulmonary effort is normal.      Breath sounds: Normal breath sounds. Abdominal:      General: Bowel sounds are normal. There is no distension. Palpations: Abdomen is soft. Tenderness: There is abdominal tenderness (epigastric). Skin:     General: Skin is warm and dry. Neurological:      Mental Status: She is alert and oriented to person, place, and time. ASSESSMENT and PLAN  Ruperto Jang is a 79-year-old female approximately 2 years out from Benito-en-Y gastric bypass at [place] by Dr. Duong Montoya complicated by multiple strictures, dilation and candycane syndrome. After her last visit with me in November, she had shoulder surgery on her left shoulder. She states that she developed epigastric pain after her left shoulder surgery. She denies taking NSAIDs postoperatively. She was seen by Dr. Katarzyna Louis in December, for epigastric pain. She was then referred for endoscopy. Her endoscopy was completed January 6, 2021 which showed the following:  -Z-line irregular, 35 cm with the incisors biopsied  -Normal mucosa was found at the entire esophagus. Biopsied  -Abnormal esophageal motility  -Small hiatal hernia  -Gastric bypass with small sized pouch and intact staple line. Gastrojejunal anastomosis characterized by healthy-appearing mucosa  -Normal mucosa was found in the cardia and in the gastric fundus. Biopsied    She presents today for follow-up. She is taking Protonix 40 mg twice daily and Carafate 4 times daily without relief. She complains of pain with water and with food. She is eating 1 time a day, in the evening. She is eating soft foods like banana and oatmeal.  She has lost 9 pounds since November. We will order an upper GI with barium tablet. Start Reglan and Levsin. Continue Protonix 40 mg twice daily and Carafate 4 times daily. We will reviewed the above with Dr. Nory Burnham. I will follow up with the patient once the upper GI is complete.   20 minutes minutes face-to-face with patient, 50% of time spent counseling  Migdalia Leon NP

## 2021-01-12 ENCOUNTER — HOSPITAL ENCOUNTER (OUTPATIENT)
Dept: GENERAL RADIOLOGY | Age: 41
Discharge: HOME | End: 2021-01-12
Attending: NURSE PRACTITIONER
Payer: OTHER GOVERNMENT

## 2021-01-12 DIAGNOSIS — R10.13 EPIGASTRIC PAIN: ICD-10-CM

## 2021-01-12 PROCEDURE — 74240 X-RAY XM UPR GI TRC 1CNTRST: CPT

## 2021-01-14 RX ORDER — HYOSCYAMINE SULFATE 0.12 MG/1
TABLET SUBLINGUAL
Qty: 30 TAB | Refills: 0 | OUTPATIENT
Start: 2021-01-14

## 2021-01-15 DIAGNOSIS — R10.13 EPIGASTRIC PAIN: Primary | ICD-10-CM

## 2021-01-15 NOTE — TELEPHONE ENCOUNTER
Reviewed upper GI with Dr. Greg Rangel. Patient states that Levsin and Reglan are not helping symptoms. We will plan to order a CT scan. Follow-up after CT scan. Patient aware and in agreement.

## 2021-01-20 ENCOUNTER — TELEPHONE (OUTPATIENT)
Dept: SURGERY | Age: 41
End: 2021-01-20

## 2021-01-20 NOTE — TELEPHONE ENCOUNTER
Spoke with patient who states she is having a lot of diarrhea. Everything she eats comes out. Informed patient she can take imodium for diarrhea,try yogart or probiotic. She can not take any milk products.   She will call back tomorrow with update,

## 2021-01-20 NOTE — TELEPHONE ENCOUNTER
Patient called stating she would like to speak with nurse regarding stomach pains for the last two days.

## 2021-01-22 ENCOUNTER — HOSPITAL ENCOUNTER (OUTPATIENT)
Dept: CT IMAGING | Age: 41
Discharge: HOME | End: 2021-01-22
Attending: NURSE PRACTITIONER
Payer: OTHER GOVERNMENT

## 2021-01-22 DIAGNOSIS — R10.13 EPIGASTRIC PAIN: ICD-10-CM

## 2021-01-22 PROCEDURE — 74177 CT ABD & PELVIS W/CONTRAST: CPT

## 2021-01-22 PROCEDURE — 74011000636 HC RX REV CODE- 636: Performed by: RADIOLOGY

## 2021-01-22 RX ADMIN — IOPAMIDOL 100 ML: 755 INJECTION, SOLUTION INTRAVENOUS at 15:00

## 2021-01-27 ENCOUNTER — TRANSCRIBE ORDER (OUTPATIENT)
Dept: REGISTRATION | Age: 41
End: 2021-01-27

## 2021-01-27 ENCOUNTER — OFFICE VISIT (OUTPATIENT)
Dept: SURGERY | Age: 41
End: 2021-01-27
Payer: OTHER GOVERNMENT

## 2021-01-27 VITALS
HEART RATE: 78 BPM | SYSTOLIC BLOOD PRESSURE: 125 MMHG | RESPIRATION RATE: 16 BRPM | BODY MASS INDEX: 27.94 KG/M2 | WEIGHT: 148 LBS | HEIGHT: 61 IN | TEMPERATURE: 98.4 F | OXYGEN SATURATION: 98 % | DIASTOLIC BLOOD PRESSURE: 79 MMHG

## 2021-01-27 DIAGNOSIS — R10.13 EPIGASTRIC PAIN: Primary | ICD-10-CM

## 2021-01-27 DIAGNOSIS — Z01.812 PRE-PROCEDURE LAB EXAM: Primary | ICD-10-CM

## 2021-01-27 PROBLEM — K45.8 INTERNAL HERNIA: Status: ACTIVE | Noted: 2021-01-27

## 2021-01-27 PROCEDURE — 99214 OFFICE O/P EST MOD 30 MIN: CPT | Performed by: SURGERY

## 2021-01-27 NOTE — H&P (VIEW-ONLY)
Surgery Progress Note 1/27/2021 CC: Abdominal pain Subjective:  
 
Extensive work-up since last visit. Upper GI relatively normal, EGD negative for stricture or ulcer. CT scan read as negative. Patient has persistent epigastric and left upper quadrant pain. Pain is 7 out of 10. Eating provokes the pain. Time and anorexia helps the pain. she eats once a day. Weight is down now into the 140s. No nausea or vomiting. No response to PPIs or Carafate. Constitutional: No fever or chills Neurologic: No headache Eyes: No scleral icterus or irritated eyes Nose: No nasal pain or drainage Mouth: No oral lesions or sore throat Cardiac: No palpations or chest pain Pulmonary: No cough or shortness or breath Gastrointestinal: Postprandial pain Genitourinary: No dysuria Musculoskeletal: No muscle or joint tenderness Skin: No rashes or lesions Psychiatric: No anxiety or depressed mood Objective:  
 
Visit Vitals /79 Pulse 78 Temp 98.4 °F (36.9 °C) (Oral) Resp 16 Ht 5' 1\" (1.549 m) Wt 148 lb (67.1 kg) SpO2 98% BMI 27.96 kg/m² General: No acute distress, conversant Eyes: PERRLA, no scleral icterus HENT: Normocephalic without oral lesions Neck: Trachea midline without LAD Cardiac: Normal pulse rate and rhythm Pulmonary: Symmetric chest rise with normal effort GI: Soft, NT, ND, no hernia, no splenomegaly Skin: Warm without rash Extremities: No edema or joint stiffness Psych: Appropriate mood and affect Assessment:  
 
16-year-old female status post gastric bypass and revision now with significant postprandial and sometimes constant left upper quadrant pain Plan: Imaging reviewed. CT scan on my read concerning for mesenteric swirl and internal hernia. No signs of incarceration of this or bowel ischemia. I recommend a diagnostic laparoscopy and possible repair of internal hernia in the very near future. Patient understands the risks and is desperate for any intervention to cure her pain. Risk of surgery include bleeding, infection, injury to the bowel and other visceral structures. Patient agrees to the above procedure and we will proceed at her earliest convenience. Total time involved with this patient's care was: 25 minutes, of which >50% of the time was spent counciling the patient. Valerie Wilson MD 
Bariatric and General Surgeon [facility]

## 2021-01-27 NOTE — PROGRESS NOTES
1. Have you been to the ER, urgent care clinic since your last visit? Hospitalized since your last visit? no    2. Have you seen or consulted any other health care providers outside of the [address] [place] [address] since your last visit? Include any pap smears or colon screening.  no

## 2021-01-27 NOTE — PROGRESS NOTES
Surgery Progress Note    1/27/2021    CC: Abdominal pain    Subjective:     Extensive work-up since last visit. Upper GI relatively normal, EGD negative for stricture or ulcer. CT scan read as negative. Patient has persistent epigastric and left upper quadrant pain. Pain is 7 out of 10. Eating provokes the pain. Time and anorexia helps the pain. she eats once a day. Weight is down now into the 140s. No nausea or vomiting. No response to PPIs or Carafate. Constitutional: No fever or chills  Neurologic: No headache  Eyes: No scleral icterus or irritated eyes  Nose: No nasal pain or drainage  Mouth: No oral lesions or sore throat  Cardiac: No palpations or chest pain  Pulmonary: No cough or shortness or breath  Gastrointestinal: Postprandial pain  Genitourinary: No dysuria  Musculoskeletal: No muscle or joint tenderness  Skin: No rashes or lesions  Psychiatric: No anxiety or depressed mood    Objective:     Visit Vitals  /79   Pulse 78   Temp 98.4 °F (36.9 °C) (Oral)   Resp 16   Ht 5' 1\" (1.549 m)   Wt 148 lb (67.1 kg)   SpO2 98%   BMI 27.96 kg/m²       General: No acute distress, conversant  Eyes: PERRLA, no scleral icterus  HENT: Normocephalic without oral lesions  Neck: Trachea midline without LAD  Cardiac: Normal pulse rate and rhythm  Pulmonary: Symmetric chest rise with normal effort  GI: Soft, NT, ND, no hernia, no splenomegaly  Skin: Warm without rash  Extremities: No edema or joint stiffness  Psych: Appropriate mood and affect    Assessment:     43-year-old female status post gastric bypass and revision now with significant postprandial and sometimes constant left upper quadrant pain    Plan:     Imaging reviewed. CT scan on my read concerning for mesenteric swirl and internal hernia. No signs of incarceration of this or bowel ischemia. I recommend a diagnostic laparoscopy and possible repair of internal hernia in the very near future.   Patient understands the risks and is desperate for any intervention to cure her pain. Risk of surgery include bleeding, infection, injury to the bowel and other visceral structures. Patient agrees to the above procedure and we will proceed at her earliest convenience. Total time involved with this patient's care was: 25 minutes, of which >50% of the time was spent counciling the patient.     Tiki Feliciano MD  Bariatric and General Surgeon  [facility]

## 2021-01-29 ENCOUNTER — HOSPITAL ENCOUNTER (OUTPATIENT)
Dept: PREADMISSION TESTING | Age: 41
Discharge: HOME | End: 2021-01-29

## 2021-01-29 VITALS
RESPIRATION RATE: 18 BRPM | DIASTOLIC BLOOD PRESSURE: 76 MMHG | TEMPERATURE: 98.1 F | BODY MASS INDEX: 27.51 KG/M2 | WEIGHT: 145.72 LBS | SYSTOLIC BLOOD PRESSURE: 115 MMHG | HEART RATE: 69 BPM | HEIGHT: 61 IN

## 2021-01-29 RX ORDER — BISMUTH SUBSALICYLATE 262 MG
1 TABLET,CHEWABLE ORAL
COMMUNITY

## 2021-01-29 RX ORDER — CHOLECALCIFEROL (VITAMIN D3) 50 MCG
CAPSULE ORAL
COMMUNITY

## 2021-01-29 RX ORDER — ONDANSETRON 4 MG/1
4 TABLET, FILM COATED ORAL
COMMUNITY

## 2021-01-29 RX ORDER — BUTALBITAL, ACETAMINOPHEN AND CAFFEINE 50; 325; 40 MG/1; MG/1; MG/1
1 TABLET ORAL
COMMUNITY

## 2021-01-29 RX ORDER — RIZATRIPTAN BENZOATE 5 MG/1
5 TABLET ORAL
COMMUNITY

## 2021-01-29 NOTE — PERIOP NOTES
PAT INTERVIEW COMPLETED WITH PT.  INFECTION PREVENTION INFORMATION GIVEN TO PT.  PT WAS GIVEN THE OPPORTUNITY TO ASK ADDITIONAL QUESTIONS.     PT HAS ORDERS TO MANAGE INSULIN PUMP PRE OP; WILL TURN RATE DOWN TO 50%, TARGET OF BS

## 2021-01-31 ENCOUNTER — HOSPITAL ENCOUNTER (OUTPATIENT)
Dept: PREADMISSION TESTING | Age: 41
Discharge: HOME | End: 2021-01-31
Payer: OTHER GOVERNMENT

## 2021-01-31 DIAGNOSIS — Z01.812 PRE-PROCEDURE LAB EXAM: ICD-10-CM

## 2021-01-31 PROCEDURE — U0003 INFECTIOUS AGENT DETECTION BY NUCLEIC ACID (DNA OR RNA); SEVERE ACUTE RESPIRATORY SYNDROME CORONAVIRUS 2 (SARS-COV-2) (CORONAVIRUS DISEASE [COVID-19]), AMPLIFIED PROBE TECHNIQUE, MAKING USE OF HIGH THROUGHPUT TECHNOLOGIES AS DESCRIBED BY CMS-2020-01-R: HCPCS

## 2021-02-01 LAB — SARS-COV-2, COV2NT: NOT DETECTED

## 2021-02-01 NOTE — PERIOP NOTES
LS,  Please see below [employer] message and advise.  Thanks,  Bela MORA RN     Patient uses insulin pump for T1DM. Received orders on martha-operative use from endocrinologist. Order placed for Consult to Diabetes Treatment to assist with insulin pump management during admission.

## 2021-02-04 ENCOUNTER — HOSPITAL ENCOUNTER (OUTPATIENT)
Age: 41
Setting detail: OUTPATIENT SURGERY
Discharge: HOME | End: 2021-02-04
Attending: SURGERY | Admitting: SURGERY
Payer: OTHER GOVERNMENT

## 2021-02-04 ENCOUNTER — ANESTHESIA (OUTPATIENT)
Dept: SURGERY | Age: 41
End: 2021-02-04
Payer: OTHER GOVERNMENT

## 2021-02-04 ENCOUNTER — ANESTHESIA EVENT (OUTPATIENT)
Dept: SURGERY | Age: 41
End: 2021-02-04
Payer: OTHER GOVERNMENT

## 2021-02-04 VITALS
DIASTOLIC BLOOD PRESSURE: 68 MMHG | HEIGHT: 61 IN | RESPIRATION RATE: 17 BRPM | TEMPERATURE: 98 F | OXYGEN SATURATION: 100 % | BODY MASS INDEX: 27.38 KG/M2 | WEIGHT: 145 LBS | HEART RATE: 75 BPM | SYSTOLIC BLOOD PRESSURE: 98 MMHG

## 2021-02-04 DIAGNOSIS — R10.84 GENERALIZED ABDOMINAL PAIN: Primary | ICD-10-CM

## 2021-02-04 PROBLEM — R10.9 ABDOMINAL PAIN: Status: ACTIVE | Noted: 2021-02-04

## 2021-02-04 PROBLEM — K45.8 MESENTERIC HERNIA: Status: ACTIVE | Noted: 2021-02-04

## 2021-02-04 PROBLEM — K45.8 INTERNAL HERNIA: Status: RESOLVED | Noted: 2021-01-27 | Resolved: 2021-02-04

## 2021-02-04 LAB
GLUCOSE BLD STRIP.AUTO-MCNC: 173 MG/DL (ref 65–100)
HCG UR QL: NEGATIVE
SERVICE CMNT-IMP: ABNORMAL

## 2021-02-04 PROCEDURE — 77030020263 HC SOL INJ SOD CL0.9% LFCR 1000ML: Performed by: SURGERY

## 2021-02-04 PROCEDURE — 74011250637 HC RX REV CODE- 250/637: Performed by: NURSE ANESTHETIST, CERTIFIED REGISTERED

## 2021-02-04 PROCEDURE — 77030008684 HC TU ET CUF COVD -B: Performed by: ANESTHESIOLOGY

## 2021-02-04 PROCEDURE — 77030020829: Performed by: SURGERY

## 2021-02-04 PROCEDURE — 76210000017 HC OR PH I REC 1.5 TO 2 HR: Performed by: SURGERY

## 2021-02-04 PROCEDURE — 74011000250 HC RX REV CODE- 250: Performed by: SURGERY

## 2021-02-04 PROCEDURE — 44238 UNLISTED LAPS PX INTESTINE: CPT | Performed by: SURGERY

## 2021-02-04 PROCEDURE — 77030040361 HC SLV COMPR DVT MDII -B: Performed by: SURGERY

## 2021-02-04 PROCEDURE — 77030002933 HC SUT MCRYL J&J -A: Performed by: SURGERY

## 2021-02-04 PROCEDURE — 74011250636 HC RX REV CODE- 250/636: Performed by: ANESTHESIOLOGY

## 2021-02-04 PROCEDURE — 82962 GLUCOSE BLOOD TEST: CPT

## 2021-02-04 PROCEDURE — 77030008756 HC TU IRR SUC STRY -B: Performed by: SURGERY

## 2021-02-04 PROCEDURE — 77030026438 HC STYL ET INTUB CARD -A: Performed by: ANESTHESIOLOGY

## 2021-02-04 PROCEDURE — 74011250636 HC RX REV CODE- 250/636: Performed by: SURGERY

## 2021-02-04 PROCEDURE — 74011250636 HC RX REV CODE- 250/636: Performed by: NURSE ANESTHETIST, CERTIFIED REGISTERED

## 2021-02-04 PROCEDURE — 76060000033 HC ANESTHESIA 1 TO 1.5 HR: Performed by: SURGERY

## 2021-02-04 PROCEDURE — 74011000250 HC RX REV CODE- 250: Performed by: NURSE ANESTHETIST, CERTIFIED REGISTERED

## 2021-02-04 PROCEDURE — 74011250637 HC RX REV CODE- 250/637: Performed by: ANESTHESIOLOGY

## 2021-02-04 PROCEDURE — 2709999900 HC NON-CHARGEABLE SUPPLY: Performed by: SURGERY

## 2021-02-04 PROCEDURE — 77030016151 HC PROTCTR LNS DFOG COVD -B: Performed by: SURGERY

## 2021-02-04 PROCEDURE — 77030013079 HC BLNKT BAIR HGGR 3M -A: Performed by: ANESTHESIOLOGY

## 2021-02-04 PROCEDURE — 81025 URINE PREGNANCY TEST: CPT

## 2021-02-04 PROCEDURE — 77030010507 HC ADH SKN DERMBND J&J -B: Performed by: SURGERY

## 2021-02-04 PROCEDURE — 77030010031 HC SCIS ENDOSC MPLR J&J -C: Performed by: SURGERY

## 2021-02-04 PROCEDURE — 77030012770 HC TRCR OPT FX AMR -B: Performed by: SURGERY

## 2021-02-04 PROCEDURE — 74011250636 HC RX REV CODE- 250/636

## 2021-02-04 PROCEDURE — 77030003666 HC NDL SPINAL BD -A: Performed by: SURGERY

## 2021-02-04 PROCEDURE — 76010000149 HC OR TIME 1 TO 1.5 HR: Performed by: SURGERY

## 2021-02-04 RX ORDER — ONDANSETRON 2 MG/ML
INJECTION INTRAMUSCULAR; INTRAVENOUS AS NEEDED
Status: DISCONTINUED | OUTPATIENT
Start: 2021-02-04 | End: 2021-02-04 | Stop reason: HOSPADM

## 2021-02-04 RX ORDER — ONDANSETRON 2 MG/ML
4 INJECTION INTRAMUSCULAR; INTRAVENOUS AS NEEDED
Status: DISCONTINUED | OUTPATIENT
Start: 2021-02-04 | End: 2021-02-04 | Stop reason: HOSPADM

## 2021-02-04 RX ORDER — FENTANYL CITRATE 50 UG/ML
25 INJECTION, SOLUTION INTRAMUSCULAR; INTRAVENOUS
Status: DISCONTINUED | OUTPATIENT
Start: 2021-02-04 | End: 2021-02-04 | Stop reason: HOSPADM

## 2021-02-04 RX ORDER — MIDAZOLAM HYDROCHLORIDE 1 MG/ML
INJECTION, SOLUTION INTRAMUSCULAR; INTRAVENOUS AS NEEDED
Status: DISCONTINUED | OUTPATIENT
Start: 2021-02-04 | End: 2021-02-04 | Stop reason: HOSPADM

## 2021-02-04 RX ORDER — LIDOCAINE HYDROCHLORIDE 20 MG/ML
INJECTION, SOLUTION EPIDURAL; INFILTRATION; INTRACAUDAL; PERINEURAL AS NEEDED
Status: DISCONTINUED | OUTPATIENT
Start: 2021-02-04 | End: 2021-02-04 | Stop reason: HOSPADM

## 2021-02-04 RX ORDER — PHENYLEPHRINE HCL IN 0.9% NACL 0.4MG/10ML
SYRINGE (ML) INTRAVENOUS AS NEEDED
Status: DISCONTINUED | OUTPATIENT
Start: 2021-02-04 | End: 2021-02-04 | Stop reason: HOSPADM

## 2021-02-04 RX ORDER — MIDAZOLAM HYDROCHLORIDE 1 MG/ML
0.5 INJECTION, SOLUTION INTRAMUSCULAR; INTRAVENOUS
Status: DISCONTINUED | OUTPATIENT
Start: 2021-02-04 | End: 2021-02-04 | Stop reason: HOSPADM

## 2021-02-04 RX ORDER — OXYCODONE AND ACETAMINOPHEN 5; 325 MG/1; MG/1
1-2 TABLET ORAL
Qty: 18 TAB | Refills: 0 | Status: SHIPPED | OUTPATIENT
Start: 2021-02-04 | End: 2021-02-07

## 2021-02-04 RX ORDER — SODIUM CHLORIDE, SODIUM LACTATE, POTASSIUM CHLORIDE, CALCIUM CHLORIDE 600; 310; 30; 20 MG/100ML; MG/100ML; MG/100ML; MG/100ML
125 INJECTION, SOLUTION INTRAVENOUS CONTINUOUS
Status: DISCONTINUED | OUTPATIENT
Start: 2021-02-04 | End: 2021-02-04 | Stop reason: HOSPADM

## 2021-02-04 RX ORDER — ACETAMINOPHEN 325 MG/1
650 TABLET ORAL ONCE
Status: COMPLETED | OUTPATIENT
Start: 2021-02-04 | End: 2021-02-04

## 2021-02-04 RX ORDER — SODIUM CHLORIDE, SODIUM LACTATE, POTASSIUM CHLORIDE, CALCIUM CHLORIDE 600; 310; 30; 20 MG/100ML; MG/100ML; MG/100ML; MG/100ML
INJECTION, SOLUTION INTRAVENOUS
Status: DISCONTINUED | OUTPATIENT
Start: 2021-02-04 | End: 2021-02-04 | Stop reason: HOSPADM

## 2021-02-04 RX ORDER — SODIUM CHLORIDE 0.9 % (FLUSH) 0.9 %
5-40 SYRINGE (ML) INJECTION AS NEEDED
Status: DISCONTINUED | OUTPATIENT
Start: 2021-02-04 | End: 2021-02-04 | Stop reason: HOSPADM

## 2021-02-04 RX ORDER — SODIUM CHLORIDE 9 MG/ML
1000 INJECTION, SOLUTION INTRAVENOUS CONTINUOUS
Status: DISCONTINUED | OUTPATIENT
Start: 2021-02-04 | End: 2021-02-04 | Stop reason: HOSPADM

## 2021-02-04 RX ORDER — MORPHINE SULFATE 10 MG/ML
2 INJECTION, SOLUTION INTRAMUSCULAR; INTRAVENOUS
Status: DISCONTINUED | OUTPATIENT
Start: 2021-02-04 | End: 2021-02-04 | Stop reason: HOSPADM

## 2021-02-04 RX ORDER — SCOLOPAMINE TRANSDERMAL SYSTEM 1 MG/1
PATCH, EXTENDED RELEASE TRANSDERMAL AS NEEDED
Status: DISCONTINUED | OUTPATIENT
Start: 2021-02-04 | End: 2021-02-04 | Stop reason: HOSPADM

## 2021-02-04 RX ORDER — FENTANYL CITRATE 50 UG/ML
INJECTION, SOLUTION INTRAMUSCULAR; INTRAVENOUS AS NEEDED
Status: DISCONTINUED | OUTPATIENT
Start: 2021-02-04 | End: 2021-02-04 | Stop reason: HOSPADM

## 2021-02-04 RX ORDER — FENTANYL CITRATE 50 UG/ML
50 INJECTION, SOLUTION INTRAMUSCULAR; INTRAVENOUS AS NEEDED
Status: DISCONTINUED | OUTPATIENT
Start: 2021-02-04 | End: 2021-02-04 | Stop reason: HOSPADM

## 2021-02-04 RX ORDER — GLYCOPYRROLATE 0.2 MG/ML
INJECTION INTRAMUSCULAR; INTRAVENOUS AS NEEDED
Status: DISCONTINUED | OUTPATIENT
Start: 2021-02-04 | End: 2021-02-04 | Stop reason: HOSPADM

## 2021-02-04 RX ORDER — POLYETHYLENE GLYCOL 3350 17 G/17G
17 POWDER, FOR SOLUTION ORAL DAILY
Qty: 14 PACKET | Refills: 1 | Status: SHIPPED | OUTPATIENT
Start: 2021-02-04 | End: 2022-02-08 | Stop reason: ALTCHOICE

## 2021-02-04 RX ORDER — NEOSTIGMINE METHYLSULFATE 1 MG/ML
INJECTION INTRAVENOUS AS NEEDED
Status: DISCONTINUED | OUTPATIENT
Start: 2021-02-04 | End: 2021-02-04 | Stop reason: HOSPADM

## 2021-02-04 RX ORDER — DIPHENHYDRAMINE HYDROCHLORIDE 50 MG/ML
12.5 INJECTION, SOLUTION INTRAMUSCULAR; INTRAVENOUS AS NEEDED
Status: DISCONTINUED | OUTPATIENT
Start: 2021-02-04 | End: 2021-02-04 | Stop reason: HOSPADM

## 2021-02-04 RX ORDER — DEXMEDETOMIDINE HYDROCHLORIDE 100 UG/ML
INJECTION, SOLUTION INTRAVENOUS AS NEEDED
Status: DISCONTINUED | OUTPATIENT
Start: 2021-02-04 | End: 2021-02-04 | Stop reason: HOSPADM

## 2021-02-04 RX ORDER — BUPIVACAINE HYDROCHLORIDE AND EPINEPHRINE 5; 5 MG/ML; UG/ML
INJECTION, SOLUTION EPIDURAL; INTRACAUDAL; PERINEURAL AS NEEDED
Status: DISCONTINUED | OUTPATIENT
Start: 2021-02-04 | End: 2021-02-04 | Stop reason: HOSPADM

## 2021-02-04 RX ORDER — SODIUM CHLORIDE 0.9 % (FLUSH) 0.9 %
5-40 SYRINGE (ML) INJECTION EVERY 8 HOURS
Status: DISCONTINUED | OUTPATIENT
Start: 2021-02-04 | End: 2021-02-04 | Stop reason: HOSPADM

## 2021-02-04 RX ORDER — OXYCODONE AND ACETAMINOPHEN 5; 325 MG/1; MG/1
1 TABLET ORAL AS NEEDED
Status: DISCONTINUED | OUTPATIENT
Start: 2021-02-04 | End: 2021-02-04 | Stop reason: HOSPADM

## 2021-02-04 RX ORDER — ROCURONIUM BROMIDE 10 MG/ML
INJECTION, SOLUTION INTRAVENOUS AS NEEDED
Status: DISCONTINUED | OUTPATIENT
Start: 2021-02-04 | End: 2021-02-04 | Stop reason: HOSPADM

## 2021-02-04 RX ORDER — ONDANSETRON 2 MG/ML
INJECTION INTRAMUSCULAR; INTRAVENOUS
Status: COMPLETED
Start: 2021-02-04 | End: 2021-02-04

## 2021-02-04 RX ORDER — EPHEDRINE SULFATE/0.9% NACL/PF 50 MG/5 ML
5 SYRINGE (ML) INTRAVENOUS AS NEEDED
Status: DISCONTINUED | OUTPATIENT
Start: 2021-02-04 | End: 2021-02-04 | Stop reason: HOSPADM

## 2021-02-04 RX ORDER — HYDROMORPHONE HYDROCHLORIDE 1 MG/ML
0.2 INJECTION, SOLUTION INTRAMUSCULAR; INTRAVENOUS; SUBCUTANEOUS
Status: DISCONTINUED | OUTPATIENT
Start: 2021-02-04 | End: 2021-02-04 | Stop reason: HOSPADM

## 2021-02-04 RX ORDER — FENTANYL CITRATE 50 UG/ML
INJECTION, SOLUTION INTRAMUSCULAR; INTRAVENOUS
Status: DISCONTINUED
Start: 2021-02-04 | End: 2021-02-04 | Stop reason: HOSPADM

## 2021-02-04 RX ORDER — SODIUM CHLORIDE 9 MG/ML
50 INJECTION, SOLUTION INTRAVENOUS CONTINUOUS
Status: DISCONTINUED | OUTPATIENT
Start: 2021-02-04 | End: 2021-02-04 | Stop reason: HOSPADM

## 2021-02-04 RX ORDER — LIDOCAINE HYDROCHLORIDE 10 MG/ML
0.1 INJECTION, SOLUTION EPIDURAL; INFILTRATION; INTRACAUDAL; PERINEURAL AS NEEDED
Status: DISCONTINUED | OUTPATIENT
Start: 2021-02-04 | End: 2021-02-04 | Stop reason: HOSPADM

## 2021-02-04 RX ORDER — MIDAZOLAM HYDROCHLORIDE 1 MG/ML
1 INJECTION, SOLUTION INTRAMUSCULAR; INTRAVENOUS AS NEEDED
Status: DISCONTINUED | OUTPATIENT
Start: 2021-02-04 | End: 2021-02-04 | Stop reason: HOSPADM

## 2021-02-04 RX ORDER — PROPOFOL 10 MG/ML
INJECTION, EMULSION INTRAVENOUS AS NEEDED
Status: DISCONTINUED | OUTPATIENT
Start: 2021-02-04 | End: 2021-02-04 | Stop reason: HOSPADM

## 2021-02-04 RX ADMIN — ONDANSETRON HYDROCHLORIDE 4 MG: 2 INJECTION, SOLUTION INTRAMUSCULAR; INTRAVENOUS at 12:09

## 2021-02-04 RX ADMIN — ROCURONIUM BROMIDE 30 MG: 10 SOLUTION INTRAVENOUS at 11:21

## 2021-02-04 RX ADMIN — GLYCOPYRROLATE 0.2 MG: 0.2 INJECTION, SOLUTION INTRAMUSCULAR; INTRAVENOUS at 12:04

## 2021-02-04 RX ADMIN — MEPERIDINE HYDROCHLORIDE 50 MG: 50 INJECTION INTRAMUSCULAR; INTRAVENOUS; SUBCUTANEOUS at 12:26

## 2021-02-04 RX ADMIN — FENTANYL CITRATE 50 MCG: 50 INJECTION, SOLUTION INTRAMUSCULAR; INTRAVENOUS at 11:43

## 2021-02-04 RX ADMIN — SODIUM CHLORIDE, POTASSIUM CHLORIDE, SODIUM LACTATE AND CALCIUM CHLORIDE 125 ML/HR: 600; 310; 30; 20 INJECTION, SOLUTION INTRAVENOUS at 10:26

## 2021-02-04 RX ADMIN — FENTANYL CITRATE 50 MCG: 50 INJECTION, SOLUTION INTRAMUSCULAR; INTRAVENOUS at 11:21

## 2021-02-04 RX ADMIN — PROPOFOL 150 MG: 10 INJECTION, EMULSION INTRAVENOUS at 11:21

## 2021-02-04 RX ADMIN — DEXMEDETOMIDINE HYDROCHLORIDE 7 MCG: 100 INJECTION, SOLUTION, CONCENTRATE INTRAVENOUS at 12:07

## 2021-02-04 RX ADMIN — FENTANYL CITRATE 50 MCG: 50 INJECTION, SOLUTION INTRAMUSCULAR; INTRAVENOUS at 11:08

## 2021-02-04 RX ADMIN — MIDAZOLAM 2 MG: 1 INJECTION INTRAMUSCULAR; INTRAVENOUS at 11:08

## 2021-02-04 RX ADMIN — FENTANYL CITRATE 25 MCG: 50 INJECTION, SOLUTION INTRAMUSCULAR; INTRAVENOUS at 13:23

## 2021-02-04 RX ADMIN — OXYCODONE HYDROCHLORIDE AND ACETAMINOPHEN 1 TABLET: 5; 325 TABLET ORAL at 14:20

## 2021-02-04 RX ADMIN — ONDANSETRON 4 MG: 2 INJECTION INTRAMUSCULAR; INTRAVENOUS at 13:25

## 2021-02-04 RX ADMIN — LIDOCAINE HYDROCHLORIDE 60 MG: 20 INJECTION, SOLUTION EPIDURAL; INFILTRATION; INTRACAUDAL; PERINEURAL at 11:21

## 2021-02-04 RX ADMIN — ACETAMINOPHEN 650 MG: 325 TABLET ORAL at 10:26

## 2021-02-04 RX ADMIN — DEXMEDETOMIDINE HYDROCHLORIDE 8 MCG: 100 INJECTION, SOLUTION, CONCENTRATE INTRAVENOUS at 11:50

## 2021-02-04 RX ADMIN — GLYCOPYRROLATE 0.4 MG: 0.2 INJECTION, SOLUTION INTRAMUSCULAR; INTRAVENOUS at 12:14

## 2021-02-04 RX ADMIN — NEOSTIGMINE METHYLSULFATE 2.5 MG: 1 INJECTION, SOLUTION INTRAVENOUS at 12:14

## 2021-02-04 RX ADMIN — Medication 80 MCG: at 12:02

## 2021-02-04 RX ADMIN — WATER 2 G: 1 INJECTION INTRAMUSCULAR; INTRAVENOUS; SUBCUTANEOUS at 11:27

## 2021-02-04 RX ADMIN — FENTANYL CITRATE 50 MCG: 50 INJECTION, SOLUTION INTRAMUSCULAR; INTRAVENOUS at 11:48

## 2021-02-04 RX ADMIN — Medication 80 MCG: at 12:06

## 2021-02-04 RX ADMIN — SCOPALAMINE 1 PATCH: 1 PATCH, EXTENDED RELEASE TRANSDERMAL at 11:59

## 2021-02-04 RX ADMIN — SODIUM CHLORIDE, POTASSIUM CHLORIDE, SODIUM LACTATE AND CALCIUM CHLORIDE: 600; 310; 30; 20 INJECTION, SOLUTION INTRAVENOUS at 10:57

## 2021-02-04 NOTE — ANESTHESIA POSTPROCEDURE EVALUATION
Post-Anesthesia Evaluation and Assessment    Patient: Joe Martin MRN: [id number]  SSN: [id number]    YOB: 1980  Age: 38 y.o. Sex: female      I have evaluated the patient and they are stable and ready for discharge from the PACU. Cardiovascular Function/Vital Signs  Visit Vitals  /61   Pulse 75   Temp 36.7 °C (98 °F)   Resp 17   Ht 5' 1\" (1.549 m)   Wt 65.8 kg (145 lb)   SpO2 99%   BMI 27.40 kg/m²       Patient is status post General anesthesia for Procedure(s):  DIAGNOSTIC LAPAROSCOPY REPAIR OF MESENTERY DEFECT. Nausea/Vomiting: None    Postoperative hydration reviewed and adequate. Pain:  Pain Scale 1: Numeric (0 - 10) (02/04/21 1340)  Pain Intensity 1: 2 (02/04/21 1340)   Managed    Neurological Status:   Neuro (WDL): Exceptions to WDL(migraines) (02/04/21 1011)  Neuro  Neurologic State: Alert (02/04/21 1340)  LUE Motor Response: Purposeful (02/04/21 1340)  LLE Motor Response: Purposeful (02/04/21 1340)  RUE Motor Response: Purposeful (02/04/21 1340)  RLE Motor Response: Purposeful (02/04/21 1340)   At baseline    Mental Status, Level of Consciousness: Alert and  oriented to person, place, and time    Pulmonary Status:   O2 Device: Room air (02/04/21 1340)   Adequate oxygenation and airway patent    Complications related to anesthesia: None    Post-anesthesia assessment completed.  No concerns    Signed By: Jason Romero MD     February 4, 2021

## 2021-02-04 NOTE — ROUTINE PROCESS
Patient: Bang Gaviria MRN: [id number]  SSN: [id number] YOB: 1980  Age: 38 y.o. Sex: female Patient is status post Procedure(s): DIAGNOSTIC LAPAROSCOPY REPAIR OF MESENTERY DEFECT. Surgeon(s) and Role: 
   * Lucero Lopez MD - Primary Local/Dose/Irrigation:   SEE MAR Peripheral IV 02/04/21 Left Antecubital (Active) Site Assessment Clean, dry, & intact 02/04/21 1022 Phlebitis Assessment 0 02/04/21 1022 Infiltration Assessment 0 02/04/21 1022 Dressing Status Clean, dry, & intact 02/04/21 1022 Dressing Type Transparent 02/04/21 1022 Hub Color/Line Status Infusing 02/04/21 1022 Action Taken Other (comment) 02/04/21 1022 Alcohol Cap Used Yes 02/04/21 1022 Airway - Endotracheal Tube 02/04/21 Oral (Active) Dressing/Packing:  Incision 02/04/21 Abdomen-Dressing/Treatment: Liquid Lerry Croft (02/04/21 1210) Splint/Cast:  ] Other:
0
3.257

## 2021-02-04 NOTE — PERIOP NOTES
Suzi Tello CRNA into interview patient Insulin Pump discussed with patient. Anesthesia to monitor pump.

## 2021-02-04 NOTE — OP NOTES
OPERATIVE NOTE    Date of Procedure: 2/4/2021     Preoperative Diagnosis: ABDOMINAL PAIN  Postoperative Diagnosis: Mesenteric defects    Procedure: Procedure(s):  DIAGNOSTIC LAPAROSCOPY REPAIR OF MESENTERY DEFECT    Surgeon: Shaina Gao MD  Assistant(s): Niya James - They were critically important in the exposure and key portions of the case  Anesthesia: General   Indications: 48-year-old female 2 years out from laparoscopic gastric bypass with persistent postprandial pain. Negative EGD work-up and negative CT scan. Here for diagnostic laparoscopy  Findings: Two 4 cm separate mesenteric defects at the [address] that were repaired with 2-0 silk. No signs of bowel herniation at this site. [facility] space, small pouch, retrocolic, antigastric. No abdominal wall hernias. Description of Operation: Brandie Bacon was identified in the pre-operative holding area. Informed consent was obtained after a complete discussion of risks, benefits and alternatives to surgery were had with the patient. The patient was brought back to the operating room and placed under general endotracheal anesthesia in the supine position on the operating room table in supine position. The patient was then prepped and draped in the usual sterile fashion. A timeout was performed. We injected local anesthetic in the left subcostal space in the midclavicular line. We used a 5 mm Optiview trocar to safely enter the abdomen. Upon inspection we did not notice any defect or injury. We then placed an additional trocar just cephalad to the umbilicus and 2 more 5 mm trochars along the right mid clavicular line. We began by general inspection of the abdomen. There did not appear to be any abdominal wall hernias, the gallbladder was absent, no obvious hiatal hernia, the Benito limb was retrocolic and appeared to be antigastric. We ran the Benito limb down from the pouch. [facility] space was closed.   Benito limb length was about 100 cm down to the JJ from the pouch. There were 2 separate 4 cm mesenteric defects at the [address] [facility] that were sutured closed with 2-0 running silk laparoscopically. We then ran the remainder of the common channel down to the terminal ileum which appeared all normal.  The BP limb was run and appeared normal.  We irrigated the abdomen secondary to some hemorrhage during the closure of the mesenteric defects. There appeared to be good hemostasis. Then removed the initial left upper quadrant trocar and observe the entry site. There was no bleeding. This was done in similar fashion to the trochars on the right side of the abdomen. We then released to the pneumoperitoneum and removed the 5 mm trocar that was supraumbilical.    The dermis was closed with 4-0 Monocryl followed by Dermabond for the skin. At the end of the procedure all instrument, needle, and sponge counts were correct. I was present and scrubbed throughout the entirety of the case. The patient awoke from anesthesia and was extubated without complication and sent to PACU in stable condition.       Estimated Blood Loss: 5cc    Specimens: None    Complications: None      Pranav Hernandez MD  Bariatric and General Surgeon  [facility]  2/4/2021

## 2021-02-04 NOTE — INTERVAL H&P NOTE
Update History & Physical 
 
The Patient's History and Physical of 1/27/21 was reviewed with the patient and I examined the patient. There was no change. The surgical site was confirmed by the patient and me. Plan:  The risk, benefits, expected outcome, and alternative to the recommended procedure have been discussed with the patient. Patient understands and wants to proceed with the procedure.  
 
Electronically signed by Anabela Amador MD on 2/4/2021 at 10:47 AM

## 2021-02-04 NOTE — DISCHARGE INSTRUCTIONS
Discharge Instructions for General Surgery Patients       1. Do not lift any objects weighing more than 15 pounds for 2 weeks. 2. Do not do any housework including vacuuming, scrubbing or yardwork for 4 weeks. 3. Do not drive or operate machinery while taking sedating or narcotic medications. 4. Post operative pain is expected. Try to wean off narcotics as soon as able and take tylenol or NSAIDS. Do not take tylenol with Norco or Percocet as this may harm your liver. No NSAIDS if you are bariatric surgery patient. 5. You may walk as desired and go up and down stairs as needed. Walking is encouraged. 6. You may shower the day after surgery. Do not take tub baths, swim or use hot tubs for 2 weeks. Pat dry wounds after with a towel. 7. Leave glue on wounds. It will fall off with time. Do not scrub around incisions. If redness develops around the glue okay to peel off in hot shower. 8. Regular diet. Take Miralax once or twice a day as needed for constipation. 9. Follow up with provider as scheduled. 10. If you experience fever (greater than 101.5), chills, vomiting or redness or drainage at surgical site, please contact your surgeons office. If you have further questions or concerns, please call your surgeons office at [phone].        ______________________________________________________________________    Anesthesia Discharge Instructions    After general anesthesia or intervenous sedation, for 24 hours or while taking prescription Narcotics:  · Limit your activities  · Do not drive or operate hazardous machinery  · If you have not urinated within 8 hours after discharge, please contact your surgeon on call.   · Do not make important personal or business decisions  · Do not drink alcoholic beverages    Report the following to your surgeon:  · Excessive pain, swelling, redness or odor of or around the surgical area  · Temperature over 100.5 degrees  · Nausea and vomiting lasting longer than 4 hours or if unable to take medication  · Any signs of decreased circulation or nerve impairment to extremity:  Change in color, persistent numbness, tingling, coldness or increased pain.   · Any questions

## 2021-02-04 NOTE — ANESTHESIA PREPROCEDURE EVALUATION
Anesthetic History     PONV          Review of Systems / Medical History  Patient summary reviewed, nursing notes reviewed and pertinent labs reviewed    Pulmonary  Within defined limits                 Neuro/Psych         Psychiatric history     Cardiovascular  Within defined limits                Exercise tolerance: >4 METS     GI/Hepatic/Renal           PUD     Endo/Other    Diabetes         Other Findings              Physical Exam    Airway  Mallampati: II  TM Distance: 4 - 6 cm  Neck ROM: normal range of motion   Mouth opening: Normal     Cardiovascular    Rhythm: regular  Rate: normal         Dental  No notable dental hx       Pulmonary  Breath sounds clear to auscultation               Abdominal  Abdominal exam normal       Other Findings            Anesthetic Plan    ASA: 2  Anesthesia type: general          Induction: Intravenous  Anesthetic plan and risks discussed with: Patient

## 2021-02-19 ENCOUNTER — VIRTUAL VISIT (OUTPATIENT)
Dept: SURGERY | Age: 41
End: 2021-02-19
Payer: OTHER GOVERNMENT

## 2021-02-19 DIAGNOSIS — R10.84 GENERALIZED ABDOMINAL PAIN: ICD-10-CM

## 2021-02-19 DIAGNOSIS — Z09 POSTOPERATIVE EXAMINATION: Primary | ICD-10-CM

## 2021-02-19 PROCEDURE — 99024 POSTOP FOLLOW-UP VISIT: CPT | Performed by: SURGERY

## 2021-02-19 NOTE — PROGRESS NOTES
1. Have you been to the ER, urgent care clinic since your last visit? Hospitalized since your last visit? no    2. Have you seen or consulted any other health care providers outside of the [address] [place] [address] since your last visit? Include any pap smears or colon screening. Endocrinologist 2/17/21.

## 2021-02-19 NOTE — PROGRESS NOTES
Surgery Progress Note    2/19/2021    CC: Postoperative state    Subjective:     Patient is 2 weeks out from diagnostic laparoscopy and closure of mesenteric defects. The patient has been doing very well at home. No further postprandial pain, nausea. Tolerating regular diet. Extremely happy with her outcome. Consent:  The patient and/or their healthcare decision maker is aware that this patient-initiated Telehealth encounter is a billable service, with coverage as determined by the patient's insurance carrier. They are aware that they may receive a bill and has provided verbal consent to proceed: Yes     This virtual visit was conducted via [employer]. Pursuant to the emergency declaration under the [facility], 1135 waiver authority and the [employer] Act, this Virtual  Visit was conducted to reduce the patient's risk of exposure to COVID-19 and provide continuity of care for an established patient. Services were provided through a video synchronous discussion virtually to substitute for in-person clinic visit. Due to this being a TeleHealth evaluation, many elements of the physical examination are unable to be assessed. Constitutional: No fever or chills  Neurologic: No headache  Eyes: No scleral icterus or irritated eyes  Nose: No nasal pain or drainage  Mouth: No oral lesions or sore throat  Cardiac: No palpations or chest pain  Pulmonary: No cough or shortness or breath  Gastrointestinal: No nausea, emesis, diarrhea, or constipation  Genitourinary: No dysuria  Musculoskeletal: No muscle or joint tenderness  Skin: No rashes or lesions  Psychiatric: No anxiety or depressed mood    Objective: There were no vitals taken for this visit.     General: No acute distress, conversant  HENT: Normocephalic  Neck: Trachea midline  Pulmonary: Symmetric chest rise with normal effort  GI: Soft, NT, ND, wounds healing well  Psych: Appropriate mood and affect    Assessment:     54-year-old female status post diagnostic laparoscopy and repair of mesenteric defects for chronic abdominal pain    Plan:     Extremely pleased with the patient's results. Strong family history of gastric ulcers and previous ulcer requiring vision in the patient. Recommend continuing daily PPI indefinitely. Nutritional labs last checked in January per patient. We will see her yearly going forward. She can see the NP's next January for routine bariatric follow-up nutritional monitoring. Follow-up as needed. Total time involved with this patient's care was: 15 minutes, of which >50% of the time was spent counciling the patient.     David Adames MD  Bariatric and General Surgeon  [facility]

## 2021-06-07 ENCOUNTER — TELEPHONE (OUTPATIENT)
Dept: SURGERY | Age: 41
End: 2021-06-07

## 2021-06-07 NOTE — TELEPHONE ENCOUNTER
Patient identified with two patient identifiers. Patient requesting lab work due to recent feeling of tiredness and exhaustion coming on for the last couple of months. Per patient she is taking all vitamins as recommended. No issues with diet intake. No C/O pain, N&V, or issues with bowel and bladder. Patient states she does get light headed at times. No episodes of fainting. Patient is following with PCP routinely currently getting treated for shingles and she will see her GYN next week. Patient states she feels she needs bariatric labs completed feels this onset is related to history of gastric bypass. Patient requesting she get labs prior to office follow to have resulted. Patient states her face is also black and blue from the shingles she is currently getting treated for. Patient informed I recommended office follow up we can even schedule it as virtual, but I  will discuss with Dr. Jeff Shultz and return call shortly.

## 2021-06-07 NOTE — TELEPHONE ENCOUNTER
Pt is calling because she feels exhausted and stated that she cannot function right at all. Pt thinks this may be due to the bariatric surgery and may need to do blood work. Please return call.

## 2021-06-11 ENCOUNTER — OFFICE VISIT (OUTPATIENT)
Dept: SURGERY | Age: 41
End: 2021-06-11
Payer: OTHER GOVERNMENT

## 2021-06-11 VITALS
WEIGHT: 138.5 LBS | TEMPERATURE: 98.5 F | RESPIRATION RATE: 20 BRPM | BODY MASS INDEX: 26.15 KG/M2 | OXYGEN SATURATION: 99 % | HEART RATE: 75 BPM | DIASTOLIC BLOOD PRESSURE: 71 MMHG | HEIGHT: 61 IN | SYSTOLIC BLOOD PRESSURE: 107 MMHG

## 2021-06-11 DIAGNOSIS — R53.81 MALAISE AND FATIGUE: ICD-10-CM

## 2021-06-11 DIAGNOSIS — E16.1 REACTIVE HYPOGLYCEMIA: ICD-10-CM

## 2021-06-11 DIAGNOSIS — K91.2 POSTOPERATIVE INTESTINAL MALABSORPTION: Primary | ICD-10-CM

## 2021-06-11 DIAGNOSIS — R61 NIGHT SWEATS: ICD-10-CM

## 2021-06-11 DIAGNOSIS — R53.83 MALAISE AND FATIGUE: ICD-10-CM

## 2021-06-11 PROCEDURE — 99213 OFFICE O/P EST LOW 20 MIN: CPT | Performed by: NURSE PRACTITIONER

## 2021-06-11 RX ORDER — GABAPENTIN 300 MG/1
CAPSULE ORAL
COMMUNITY
Start: 2021-06-08 | End: 2022-02-08 | Stop reason: ALTCHOICE

## 2021-06-11 RX ORDER — OXYCODONE AND ACETAMINOPHEN 5; 325 MG/1; MG/1
TABLET ORAL
COMMUNITY
Start: 2021-05-24 | End: 2022-02-08 | Stop reason: ALTCHOICE

## 2021-06-11 NOTE — PROGRESS NOTES
Chief Complaint   Patient presents with    Surgical Follow-up     4 months s/p repair Mesenteric Defect       Shasha Bacon is almost 4  years status post gastric bypass and 4 months s/p repair of mensenteric defect. Presents today with c/o extreme fatigue. Reports her Katelin Neri feels great and I can finally eat\". She is a Type 1 diabetic and on an insulin pump   Diet recall:   - coffee with SF creamer  - Lunch:  Refried beans + cheese enchilada   - Dinner: 1/2 burger erin, white rice, black eyed peas and butter beans   Pack PB crackers later b/c \"sugar was low\"     Most days no breakfast and admits skipping meals and loves Hernandez Soup   BS up and down lately and she has Shingles left hip area dx last month   On gabapentin for the pain     No n/v  No abdominal pain   BM most days and no black or bloody stools   + night sweats / hot flashes   Sleep ok with Trazodone but wakes up \"exhausted\"     Recent labs with endocrine:    Iron = 122  Hgb = 12.2  B12 = 439  BS = 159   Normal CMP   Protein = 7.5   Albumin = 4.0     Periods are \"really bad and painful\" with clots and cycle q 2 weeks   Takes Tylenol without relief     No ETOH   No tobacco and no NSAIDS     Faithful with vitamins     Physical Exam  Visit Vitals  /71   Pulse 75   Temp 98.5 °F (36.9 °C)   Resp 20   Ht 5' 1\" (1.549 m)   Wt 138 lb 8 oz (62.8 kg)   SpO2 99%   BMI 26.17 kg/m²     A + O x 3, looks well and here with daughter   Chest  CTA, unlabored   COR  RRR  ABD Soft,  NT/ND, no masses or hernias   EXT No edema; ambulating independently       ICD-10-CM ICD-9-CM    1. Postoperative intestinal malabsorption  K91.2 579.3 TSH 3RD GENERATION      COPPER      ZINC      VITAMIN A      VITAMIN B1, WHOLE BLOOD      TSH 3RD GENERATION      COPPER      ZINC      VITAMIN A      VITAMIN B1, WHOLE BLOOD   2.  Reactive hypoglycemia  E16.1 251.2 TSH 3RD GENERATION      COPPER      ZINC      VITAMIN A      VITAMIN B1, WHOLE BLOOD      TSH 3RD GENERATION COPPER      ZINC      VITAMIN A      VITAMIN B1, WHOLE BLOOD   3. BMI 26.0-26.9,adult  Z68.26 V85.22 TSH 3RD GENERATION      COPPER      ZINC      VITAMIN A      VITAMIN B1, WHOLE BLOOD      TSH 3RD GENERATION      COPPER      ZINC      VITAMIN A      VITAMIN B1, WHOLE BLOOD   4. Malaise and fatigue  R53.81 780.79 TSH 3RD GENERATION    R53.83  COPPER      ZINC      VITAMIN A      VITAMIN B1, WHOLE BLOOD      TSH 3RD GENERATION      COPPER      ZINC      VITAMIN A      VITAMIN B1, WHOLE BLOOD   5. Night sweats  R61 780.8 TSH 3RD GENERATION      COPPER      ZINC      VITAMIN A      VITAMIN B1, WHOLE BLOOD      TSH 3RD GENERATION      COPPER      ZINC      VITAMIN A      VITAMIN B1, WHOLE BLOOD       Shasha Bacon is 4 years  s/p gastric bypass   98% excess weight loss and weight is stable   Symptoms consistent with reactive hypoglycemia / hypoglycemia   She skips meals and goes long periods of time without eating and then will have popcorn or something soft, like beans   Reviewed s/s reactive hypoglycemia and she is seeing Endo next week   Will check trace mineral levels and TSH   Other labs were good   Menorrhagia and painful periods - has GYN appt next week to discuss treatment options   Advised to avoid medications that contribute to weight gain (like Depo, megace or Nexplanon)   Diet resume protein shake in the am and at lunch   RD visit   Avoid going > 5 hours without eating   Continue vitamins  Activity daily walking 10K steps every day   Sleep hygiene reviewed   Follow-up in after labs   Reminded no NSAIDS   Support group  Shasha Bacon verbalized understanding and questions were answered to the best of my knowledge and ability. Diet, activity and mindfulness educational materials were provided. 33 minutes spent in face to face with Shasha Bacon > 50% counseling.

## 2021-06-11 NOTE — PATIENT INSTRUCTIONS
I will follow up when I get your labs back Resume the [place] shake 1-2 per day. Make a protein coffee and add one at lunch A big carb load = crash and you feel lousy Think about how the food makes you feel vs \"god or bad\" Follow up with GYN and Endo Stay on your vitamins every day Reactive Hypoglycemia Reactive hypoglycemia is a medical term describing recurrent episodes of symptomatic hypoglycemia (low blood sugar) occurring 2-4 hours after eating a high carbohydrate meal. 
 
There are two kinds of hypoglycemia, FASTING and REACTIVE. The REACTIVE type is more common and happens when the blood sugar falls too low after a meal or snack. FASTING hypoglycemia occurs when the stomach is empty and no food has been eaten for several hours. People with REACTIVE hypoglycemia still produce insulin through their pancreas. Insulin is the hormone that is needed to regulate blood glucose (blood sugar). It's not as efficient as it should be, resulting in too much production of insulin in the blood stream, causing the blood sugar to drop too low. It's the body's inability to handle large amounts of sugar (in some cases even small amounts) that is consumed throughout the day. Some symptoms of REACTIVE hypoglycemia may be: 
  
 * SWEATING  * HUNGER 
 * DROWSINES  * DIFFICULTY SPEAKING 
 * ANXIETY   * CONFUSION 
 * NAUSEA   * TREMBLING 
 * HEADACHES  * DIZZINESS 
 * IRRITABILITY  * CRAVING SWEETS 
 * RUNNY NOSE  * DEPRESSION 
 * FEELING WARM OR FLUSHED * INABILITY TO CONCENTRATE In most patients, REACTIVE hypoglycemia is completely avoidable and can be managed with dietary changes. GLYCEMIC INDEX The GLYCEMIC INDEX (GI) is a measure of the effects of carbohydrates on blood sugar levels. Carbohydrates that break down easily and quickly during digestion, release glucose rapidly into the bloodstream are HIGH GI FOODS.  Carbohydrates that break down more slowly have a LOW GI rating. Foods that are LOW GI are high in fiber. Protein rich foods are LOW GI. Foods that are highly processes are usually HIGH GI foods. Most starchy, sugary, snack foods are HIGH GI and should be avoided. LOW GI (Good choices)  Most vegetables ok (except          potatoes, corn, peas and               carrots). Whole fresh fruit like        apples, berries, grapes,                 pineapple (Avoid dries fruit, fruit    juices, watermelon, bananas). High fiber breads). High fiber        breads (>3 grams per slice);         legumes (navy beans, hanks           beans, kidney beans, lentils,     etc); low-fat dairy products. MEDIUM GI  Whole wheat products, sweet       potato, brown rice, plain oatmeal. 
  
 
HIGH GI (AVOID)  Corn flakes, Rice Krispies, rice     cakes, popcorn, pretzels, snack    crackers, animal crackers, fig        bars, dried fruits, fruit juices,          white rice, white pastas, bagels,    breads, rolls, white/baked              potatoes, grits, muffins,                 pancakes, cookies, candies,         cakes, sugary drinks, alcohol,        any highly processed sugary        cereal, snack food or        convenience food. ** Eat 4-5 small meals daily. Make sure each mini meal has protein and fiber. Choose foods with a LOW GLYCEMIC INDEX and you can avoid symptoms of REACTIVE HYPOGLYCEMIA and feel better! TIP #1 Treatment of REACTIVE hypoglycemia is an ongoing process;         remember to have a good diet with evenly spaced meals   throughout the day. Tip #2 Daily exercise can help stabilize blood sugars and help control the   symptoms of hypoglycemia. Tip #3 Choose protein and fiber rich foods every meal. Lean meats or   seafood, low fat dairy products and colorful vegetables are great,   choices. Tip #4 If you experience a low blood sugar, drink 1/2 cup of skim milk   (fat-free). 1% milk or eat a light yogurt.  Ideally, try and eat something with some protein that can be easily ingested. A High protein Slimfast, Low-Carb [place] Instant Breakfast or Boost Glucose control will also work, as will 1/2 a protein bar. Tip #5 Avoid alcohol, caffeine, sugar and highly starchy foods such as white rice, bread, crackers, popcorn, potatoes, pretzels, and other snack type foods that are highly processed with very little nutritional benefit (empty calories). Tip #6 Avoid fruit juices and dried fruits. The natural sugar content is very high and will contribute to hypoglycemic episodes.

## 2021-06-11 NOTE — PROGRESS NOTES
1. Have you been to the ER, urgent care clinic since your last visit? Hospitalized since your last visit? No    2. Have you seen or consulted any other health care providers outside of the [address] [place] [address] since your last visit? Include any pap smears or colon screening.  No

## 2021-12-29 ENCOUNTER — HOSPITAL ENCOUNTER (EMERGENCY)
Age: 41
Discharge: HOME | End: 2021-12-30
Admitting: STUDENT IN AN ORGANIZED HEALTH CARE EDUCATION/TRAINING PROGRAM
Payer: OTHER GOVERNMENT

## 2021-12-29 ENCOUNTER — APPOINTMENT (OUTPATIENT)
Dept: GENERAL RADIOLOGY | Age: 41
End: 2021-12-29
Attending: NURSE PRACTITIONER
Payer: OTHER GOVERNMENT

## 2021-12-29 DIAGNOSIS — Z20.822 EXPOSURE TO CONFIRMED CASE OF COVID-19: ICD-10-CM

## 2021-12-29 DIAGNOSIS — R07.89 ATYPICAL CHEST PAIN: ICD-10-CM

## 2021-12-29 DIAGNOSIS — K59.00 CONSTIPATION, UNSPECIFIED CONSTIPATION TYPE: ICD-10-CM

## 2021-12-29 DIAGNOSIS — J06.9 UPPER RESPIRATORY TRACT INFECTION, UNSPECIFIED TYPE: Primary | ICD-10-CM

## 2021-12-29 DIAGNOSIS — N63.20 LEFT BREAST MASS: ICD-10-CM

## 2021-12-29 LAB
BASOPHILS # BLD: 0 K/UL (ref 0–0.1)
BASOPHILS NFR BLD: 0 % (ref 0–1)
DIFFERENTIAL METHOD BLD: ABNORMAL
EOSINOPHIL # BLD: 0.1 K/UL (ref 0–0.4)
EOSINOPHIL NFR BLD: 2 % (ref 0–7)
ERYTHROCYTE [DISTWIDTH] IN BLOOD BY AUTOMATED COUNT: 15.3 % (ref 11.5–14.5)
HCT VFR BLD AUTO: 31 % (ref 35–47)
HGB BLD-MCNC: 9.9 G/DL (ref 11.5–16)
IMM GRANULOCYTES # BLD AUTO: 0 K/UL (ref 0–0.04)
IMM GRANULOCYTES NFR BLD AUTO: 0 % (ref 0–0.5)
LYMPHOCYTES # BLD: 2.3 K/UL (ref 0.8–3.5)
LYMPHOCYTES NFR BLD: 41 % (ref 12–49)
MCH RBC QN AUTO: 26.3 PG (ref 26–34)
MCHC RBC AUTO-ENTMCNC: 31.9 G/DL (ref 30–36.5)
MCV RBC AUTO: 82.2 FL (ref 80–99)
MONOCYTES # BLD: 0.3 K/UL (ref 0–1)
MONOCYTES NFR BLD: 6 % (ref 5–13)
NEUTS SEG # BLD: 2.8 K/UL (ref 1.8–8)
NEUTS SEG NFR BLD: 51 % (ref 32–75)
NRBC # BLD: 0 K/UL (ref 0–0.01)
NRBC BLD-RTO: 0 PER 100 WBC
PLATELET # BLD AUTO: 208 K/UL (ref 150–400)
PMV BLD AUTO: 11.1 FL (ref 8.9–12.9)
RBC # BLD AUTO: 3.77 M/UL (ref 3.8–5.2)
WBC # BLD AUTO: 5.6 K/UL (ref 3.6–11)

## 2021-12-29 PROCEDURE — 80053 COMPREHEN METABOLIC PANEL: CPT

## 2021-12-29 PROCEDURE — 99284 EMERGENCY DEPT VISIT MOD MDM: CPT

## 2021-12-29 PROCEDURE — 36415 COLL VENOUS BLD VENIPUNCTURE: CPT

## 2021-12-29 PROCEDURE — 85379 FIBRIN DEGRADATION QUANT: CPT

## 2021-12-29 PROCEDURE — 85025 COMPLETE CBC W/AUTO DIFF WBC: CPT

## 2021-12-29 PROCEDURE — 93005 ELECTROCARDIOGRAM TRACING: CPT

## 2021-12-29 PROCEDURE — 84484 ASSAY OF TROPONIN QUANT: CPT

## 2021-12-29 PROCEDURE — U0005 INFEC AGEN DETEC AMPLI PROBE: HCPCS

## 2021-12-29 PROCEDURE — 71045 X-RAY EXAM CHEST 1 VIEW: CPT

## 2021-12-29 NOTE — Clinical Note
[facility] EMERGENCY DEPT  [address] [facility] [address]  [phone]    Work/School Note    Date: 12/29/2021     To Whom It May concern:    Shasha Bacon was evaluated by the following provider(s):  Nurse Practitioner: Tim Bradley NP.   Gabrielazack [place] virus is suspected. Per the CDC guidelines we recommend home isolation until the following conditions are all met:    1. At least 10 days have passed since symptoms first appeared and  2. At least 24 hours have passed since last fever without the use of fever-reducing medications and  3.  Symptoms (e.g., cough, shortness of breath) have improved      Sincerely,          Karthikeyan Vickers NP

## 2021-12-30 ENCOUNTER — APPOINTMENT (OUTPATIENT)
Dept: CT IMAGING | Age: 41
End: 2021-12-30
Attending: NURSE PRACTITIONER
Payer: OTHER GOVERNMENT

## 2021-12-30 VITALS
TEMPERATURE: 98.2 F | BODY MASS INDEX: 25.49 KG/M2 | RESPIRATION RATE: 17 BRPM | SYSTOLIC BLOOD PRESSURE: 113 MMHG | HEIGHT: 61 IN | OXYGEN SATURATION: 100 % | WEIGHT: 135 LBS | HEART RATE: 71 BPM | DIASTOLIC BLOOD PRESSURE: 72 MMHG

## 2021-12-30 LAB
ALBUMIN SERPL-MCNC: 3.3 G/DL (ref 3.5–5)
ALBUMIN/GLOB SERPL: 1.1 {RATIO} (ref 1.1–2.2)
ALP SERPL-CCNC: 86 U/L (ref 45–117)
ALT SERPL-CCNC: 48 U/L (ref 12–78)
ANION GAP SERPL CALC-SCNC: 7 MMOL/L (ref 5–15)
AST SERPL W P-5'-P-CCNC: 45 U/L (ref 15–37)
ATRIAL RATE: 85 BPM
BILIRUB SERPL-MCNC: 0.2 MG/DL (ref 0.2–1)
BUN SERPL-MCNC: 10 MG/DL (ref 6–20)
BUN/CREAT SERPL: 19 (ref 12–20)
CA-I BLD-MCNC: 8 MG/DL (ref 8.5–10.1)
CALCULATED P AXIS, ECG09: 31 DEGREES
CALCULATED R AXIS, ECG10: 13 DEGREES
CALCULATED T AXIS, ECG11: 23 DEGREES
CHLORIDE SERPL-SCNC: 111 MMOL/L (ref 97–108)
CO2 SERPL-SCNC: 22 MMOL/L (ref 21–32)
CREAT SERPL-MCNC: 0.53 MG/DL (ref 0.55–1.02)
D DIMER PPP FEU-MCNC: 1.11 UG/ML(FEU)
DIAGNOSIS, 93000: NORMAL
GLOBULIN SER CALC-MCNC: 3.1 G/DL (ref 2–4)
GLUCOSE SERPL-MCNC: 168 MG/DL (ref 65–100)
P-R INTERVAL, ECG05: 156 MS
POTASSIUM SERPL-SCNC: 3.6 MMOL/L (ref 3.5–5.1)
PROT SERPL-MCNC: 6.4 G/DL (ref 6.4–8.2)
Q-T INTERVAL, ECG07: 386 MS
QRS DURATION, ECG06: 78 MS
QTC CALCULATION (BEZET), ECG08: 459 MS
SARS-COV-2, COV2: NORMAL
SODIUM SERPL-SCNC: 140 MMOL/L (ref 136–145)
TROPONIN-HIGH SENSITIVITY: 3 NG/L (ref 0–51)
VENTRICULAR RATE, ECG03: 85 BPM

## 2021-12-30 PROCEDURE — 71275 CT ANGIOGRAPHY CHEST: CPT

## 2021-12-30 PROCEDURE — 74011000636 HC RX REV CODE- 636: Performed by: NURSE PRACTITIONER

## 2021-12-30 RX ADMIN — IOPAMIDOL 100 ML: 755 INJECTION, SOLUTION INTRAVENOUS at 02:01

## 2021-12-30 NOTE — ED NOTES
Patient sitting in chair denies needs at this time, beverages offered, patient declined. Encourage patient to call for needs or changes in symptoms. Patient verbalized understanding awaiting lab results.

## 2021-12-30 NOTE — ED TRIAGE NOTES
Pt has a sore throat, when she takes a deep breath she coughs, headache, and feels like elephant is sitting on chest.  Pt states she was exposed to covid

## 2021-12-30 NOTE — ED NOTES
Patient returned from CT exam, warm blanket provided for comfort, patient denies complaint, ensured call bell within reach. Awaiting results.

## 2021-12-30 NOTE — ED PROVIDER NOTES
EMERGENCY DEPARTMENT HISTORY AND PHYSICAL EXAM      Date: 12/29/2021  Patient Name: Nikos Bacon    History of Presenting Illness     Chief Complaint   Patient presents with    Flu Like Symptoms       History Provided By: Patient    HPI: Pedro Cisneros, 42 y.o. female with a past medical history significant for DM, HTN, PUD, anxiety/depression and migraines presents to the ED with cc of low grade fever, sore throat, rhinorrhea, dry cough and headache x2 days. She also reports constant bilateral chest pressure which she describes as \"an elephant sitting on my chest \". She denies any associated SOB but reports difficulty taking a deep breath. SHe denies any previous CV history. She states she is Covid vaccinated but did have a known Covid positive exposure. States she has been monitoring her sugar closely and her glucose has been normal.    There are no other complaints, changes, or physical findings at this time. PCP: Rashard Humphrey MD    No current facility-administered medications on file prior to encounter. Current Outpatient Medications on File Prior to Encounter   Medication Sig Dispense Refill    gabapentin (NEURONTIN) 300 mg capsule       oxyCODONE-acetaminophen (PERCOCET) 5-325 mg per tablet       polyethylene glycol (MIRALAX) 17 gram packet Take 1 Packet by mouth daily. Use as needed to help treat post operative constipation. 1-2 packets mixed with liquid daily until stools become soft and regular and then you may wean off or continue as needed. 14 Packet 1    rizatriptan (MAXALT) 5 mg tablet Take 5 mg by mouth once as needed for Migraine. May repeat in 2 hours if needed      butalbital-acetaminophen-caffeine (FIORICET, ESGIC) -40 mg per tablet Take 1 Tab by mouth every four (4) hours as needed for Headache.  multivitamin (ONE A DAY) tablet Take 1 Tab by mouth nightly.  B.infantis-B.ani-B.long-B.bifi (Probiotic 4X) 10-15 mg TbEC Take  by mouth.       ondansetron hcl (Zofran) 4 mg tablet Take 4 mg by mouth every eight (8) hours as needed for Nausea or Vomiting.  hyoscyamine SL (Levsin/SL) 0.125 mg SL tablet 1 Tab by SubLINGual route every four (4) hours as needed for Cramping. 30 Tab 0    sucralfate (CARAFATE) 1 gram tablet Take 1 g by mouth four (4) times daily.  pantoprazole (PROTONIX) 40 mg tablet Take 40 mg by mouth two (2) times a day. Staarted 12/14/2020 2 tablets a day for 1 week then take 1 tablet a day       insulin pump (PATIENT SUPPLIED) misc Resume prior to admission settings/recommendations (Patient taking differently: Resume prior to admission settings/recommendations  30-40 units daily) 1 Each 0    traZODone (DESYREL) 50 mg tablet Take 75 mg by mouth nightly.  FLUoxetine (PROzac) 20 mg tablet Take 20 mg by mouth daily.  topiramate (Topamax) 100 mg tablet Take  by mouth two (2) times a day.  cholecalciferol (VITAMIN D3) (50,000 UNITS /1250 MCG) capsule Take  by mouth every seven (7) days. Every friday         Past History     Past Medical History:  Past Medical History:   Diagnosis Date    Anxiety     Autoimmune disease ([facility] [address]     Bloating     Depression     AND ANXIETY    Diabetes ([facility] [address]     Type I     Diarrhea     DKA (diabetic ketoacidoses) 11/26/2020    DKA (diabetic ketoacidoses) 11/2020    POST SHOULDER SURGERY-AND CLOTS IN RIGHT FOREARM    DM (diabetes mellitus), type 1 ([address] 11/26/2020    Fatigue     Headache     Kidney stones     Loss of appetite     Migraine 11/26/2020    Nausea     Nausea & vomiting     PUD (peptic ulcer disease)     X3       Past Surgical History:  Past Surgical History:   Procedure Laterality Date    HX ABDOMINAL LAPAROSCOPY  02/04/2021    Diagnostic laparoscopy repair of mesentery defecit by Dr. Francoise Cordero.     HX CHOLECYSTECTOMY  2018    Dr. Jovanni Salvador  2048    Dr. Prosper Barahona [facility])   [address], [facility] CANE SYNDROME\"    HX GYN      C section [id number], 2000, 2005    HX GYN  2017    Uterine Ablation     HX ORTHOPAEDIC  11/18/2020    frozen shoulder -left -repair    VT ABDOMEN SURGERY PROC UNLISTED      STRICTURE AND HAS HAD 8 SURGERIES TO STRETCH        Family History:  Family History   Problem Relation Age of Onset    Hypertension Mother     Heart Disease Mother         VALVE ISSUE    Cancer Father         COLON    Hypertension Father     No Known Problems Brother     No Known Problems Brother     Heart Disease Brother     Psychiatric Disorder Brother         DEPRESSION    Anesth Problems Brother         \"SUPER ANGRY\"    No Known Problems Son     No Known Problems Son     Other Daughter         BORN WITHOUT A UTERUS       Social History:  Social History     Tobacco Use    Smoking status: Never Smoker    Smokeless tobacco: Never Used   Substance Use Topics    Alcohol use: Not Currently    Drug use: Never       Allergies: Allergies   Allergen Reactions    Adhesive Tape-Silicones Contact Dermatitis     Erythema     Chlorhexidine Rash    Other Medication Rash     dermabond. Review of Systems     Review of Systems   Constitutional: Positive for chills, fatigue and fever. HENT: Positive for rhinorrhea and sore throat. Respiratory: Positive for cough, chest tightness and shortness of breath. Negative for wheezing. Cardiovascular: Positive for chest pain. Negative for palpitations. Gastrointestinal: Negative. Negative for nausea and vomiting. Genitourinary: Negative. Neurological: Positive for headaches. Negative for dizziness. All other systems reviewed and are negative. Physical Exam     Physical Exam  Vitals and nursing note reviewed. Constitutional:       General: She is not in acute distress. Appearance: Normal appearance. She is not toxic-appearing. HENT:      Head: Normocephalic and atraumatic.       Right Ear: Tympanic membrane normal.      Left Ear: Tympanic membrane normal.      Nose: Rhinorrhea present. Rhinorrhea is clear. Mouth/Throat:      Lips: Pink. Mouth: Mucous membranes are moist.      Pharynx: Uvula midline. Posterior oropharyngeal erythema present. No pharyngeal swelling or oropharyngeal exudate. Eyes:      Extraocular Movements: Extraocular movements intact. Conjunctiva/sclera: Conjunctivae normal.   Cardiovascular:      Rate and Rhythm: Normal rate and regular rhythm. Heart sounds: Normal heart sounds. Pulmonary:      Effort: Pulmonary effort is normal. No tachypnea or accessory muscle usage. Breath sounds: Normal breath sounds. No wheezing or rales. Chest:      Chest wall: Tenderness present. No crepitus. Abdominal:      General: Bowel sounds are normal.      Palpations: Abdomen is soft. Tenderness: There is no abdominal tenderness. Musculoskeletal:         General: Normal range of motion. Right lower leg: No edema. Left lower leg: No edema. Skin:     General: Skin is warm and dry. Neurological:      General: No focal deficit present. Mental Status: She is alert. Psychiatric:         Mood and Affect: Mood normal.         Behavior: Behavior normal. Behavior is cooperative. Lab and Diagnostic Study Results     Labs -     Recent Results (from the past 12 hour(s))   CBC WITH AUTOMATED DIFF    Collection Time: 12/29/21 11:41 PM   Result Value Ref Range    WBC 5.6 3.6 - 11.0 K/uL    RBC 3.77 (L) 3.80 - 5.20 M/uL    HGB 9.9 (L) 11.5 - 16.0 g/dL    HCT 31.0 (L) 35.0 - 47.0 %    MCV 82.2 80.0 - 99.0 FL    MCH 26.3 26.0 - 34.0 PG    MCHC 31.9 30.0 - 36.5 g/dL    RDW 15.3 (H) 11.5 - 14.5 %    PLATELET 429 751 - 930 K/uL    MPV 11.1 8.9 - 12.9 FL    NRBC 0.0 0.0  WBC    ABSOLUTE NRBC 0.00 0.00 - 0.01 K/uL    NEUTROPHILS 51 32 - 75 %    LYMPHOCYTES 41 12 - 49 %    MONOCYTES 6 5 - 13 %    EOSINOPHILS 2 0 - 7 %    BASOPHILS 0 0 - 1 %    IMMATURE GRANULOCYTES 0 0 - 0.5 %    ABS.  NEUTROPHILS 2.8 1.8 - 8.0 K/UL    ABS. LYMPHOCYTES 2.3 0.8 - 3.5 K/UL    ABS. MONOCYTES 0.3 0.0 - 1.0 K/UL    ABS. EOSINOPHILS 0.1 0.0 - 0.4 K/UL    ABS. BASOPHILS 0.0 0.0 - 0.1 K/UL    ABS. IMM. GRANS. 0.0 0.00 - 0.04 K/UL    DF AUTOMATED     METABOLIC PANEL, COMPREHENSIVE    Collection Time: 12/29/21 11:41 PM   Result Value Ref Range    Sodium 140 136 - 145 mmol/L    Potassium 3.6 3.5 - 5.1 mmol/L    Chloride 111 (H) 97 - 108 mmol/L    CO2 22 21 - 32 mmol/L    Anion gap 7 5 - 15 mmol/L    Glucose 168 (H) 65 - 100 mg/dL    BUN 10 6 - 20 mg/dL    Creatinine 0.53 (L) 0.55 - 1.02 mg/dL    BUN/Creatinine ratio 19 12 - 20      GFR est AA >60 >60 ml/min/1.73m2    GFR est non-AA >60 >60 ml/min/1.73m2    Calcium 8.0 (L) 8.5 - 10.1 mg/dL    Bilirubin, total 0.2 0.2 - 1.0 mg/dL    AST (SGOT) 45 (H) 15 - 37 U/L    ALT (SGPT) 48 12 - 78 U/L    Alk. phosphatase 86 45 - 117 U/L    Protein, total 6.4 6.4 - 8.2 g/dL    Albumin 3.3 (L) 3.5 - 5.0 g/dL    Globulin 3.1 2.0 - 4.0 g/dL    A-G Ratio 1.1 1.1 - 2.2     TROPONIN-HIGH SENSITIVITY    Collection Time: 12/29/21 11:41 PM   Result Value Ref Range    Troponin-High Sensitivity 3 0 - 51 ng/L   D DIMER    Collection Time: 12/29/21 11:41 PM   Result Value Ref Range    D DIMER 1.11 (H) <0.50 ug/ml(FEU)       Radiologic Studies -   @lastxrresult@  CT Results  (Last 48 hours)               12/30/21 0151  CTA CHEST W OR W WO CONT Final result    Impression:      No pulmonary emboli. Density in the left breast appears grossly stable compared to prior study. This   may be part of fibroglandular tissue or other, although mammography is more   sensitive for evaluation of the breasts. Follow-up as clinically indicated. Please see full report.        Narrative:  CTA chest with intravenous contrast, 100 cc Isovue-370, 3-D MIP images       Dose reduction: All CT scans at this facility are performed using dose reduction   optimization techniques as appropriate to a performed exam including the   following: Automated exposure control, adjustments of the mA and/or kV according   to patient size, or use of iterative reconstruction technique. INDICATION: Evaluate for pulmonary embolism       COMPARISON: 11/26/2020       FINDINGS:       No pulmonary emboli are identified. No aneurysm or dissection of thoracic aorta. No mediastinal adenopathy. No pleural or pericardial effusions. Postoperative   changes stomach. Cholecystectomy. Moderate amount of stool in the visualized   colon in the upper abdomen. Probable punctate nonobstructive calcification in   the upper pole of left kidney. Approximately 25 mm density inferior to the left   nipple appears grossly stable compared to prior study, although mammography is   more sensitive for evaluation of the breasts. No airspace disease in the lungs. No pneumothorax. No acute fracture in the visualized bony structures. CXR Results  (Last 48 hours)               12/29/21 2356  XR CHEST PORT Final result    Impression:      No airspace disease in the lungs. Follow-up as clinically indicated. Narrative:  Chest one view       INDICATION: Cough       COMPARISON: 11/26/2020       FINDINGS:       Cardiac silhouette is stable. No jessica pulmonary edema. No airspace disease in   the lungs. No pleural effusion or pneumothorax. No displaced fracture in the   visualized bony structures. HEART SCORE:   History: Slightly or Non-suspicious  ECG: Normal  Age: Less than or equal to 45 years  Risk Factors: 1 or 2 risk factors  Troponin: Less than or equal to normal limit   HEART Score Total : 1     Management   Scores 0-3: 0.9-1.7% risk of adverse cardiac event. In the HEART Score study, these patients were discharged (0.99% in the retrospective study, 1.7% in the prospective study)   Scores 4-6: 12-16.6% risk of adverse cardiac event. In the HEART Score study, these patients were admitted to the hospital. (11.6% retrospective, 16.6% prospective)   Scores ? 7: 50-65% risk of adverse cardiac event. In the HEART Score study, these patients were candidates for early invasive measures. (65.2% retrospective, 50.1% prospective)   A MACE (Major Adverse Cardiac Event) was defined as all-cause mortality, myocardial infarction, or coronary revascularization. Original/Primary Reference  · Mayito SHAW, Ken Vega. Chest pain in the emergency room: value of the HEART score. Neth Heart J. 2008;16(6):191-6. Validation  · Mayito Vega, Ken MORALES, et al. A prospective validation of the HEART score for chest pain patients at the emergency department. Int J Cardiol. 2013;168(3):2153-8. · Mayito Vega, Esme Casillas et al. Chest pain in the emergency room: a multicenter validation of the HEART Score. Crit Pathw Cardiol. 2010;9(3):164-9. · Walker DMITRIY, Siddhartha RF, [place] PAPPAS, et al. The HEART Pathway Randomized Controlled Trial One Year Outcomes. [facility] 2018; Medical Decision Making   - I am the first provider for this patient. - I reviewed the vital signs, available nursing notes, past medical history, past surgical history, family history and social history. - Initial assessment performed. The patients presenting problems have been discussed, and they are in agreement with the care plan formulated and outlined with them. I have encouraged them to ask questions as they arise throughout their visit. Vital Signs-Reviewed the patient's vital signs. Patient Vitals for the past 12 hrs:   Temp Pulse Resp BP SpO2   12/30/21 0232 98.2 °F (36.8 °C) 71 17 113/72 100 %   12/30/21 0015 98.5 °F (36.9 °C) 77 16 114/70 100 %   12/29/21 2154 98.6 °F (37 °C) 89 18 134/83 99 %       Records Reviewed: Nursing Notes    The patient presents with chest pain/fever with a differential diagnosis of  ACS, bronchitis, chest wall pain, pnuemonia, PE, COVID, other viral illness      ED Course:   Patient presents with fever, URI symptoms and chest pressure with SOB x2 days. . Patient is Covid vaccinated. History of DM and HTN; no CV history. HEART score 1. EKG: normal sinus rhythm, no ischemia. Patient is nontoxic-appearing, vital signs stableafebrile, no hypoxia. Labs: Mild anemia Hgb 9.9/HCT 31.0 baseline, glucose 168, D-dimer 1.11, troponin normal.  Covid pending. CTA chest obtainednegative PE, incidental finding of constipation and stable left breast density. Patient advises she has a pending mammogram this month. Reinforced importance of GYN/mammogram follow-up. Discussed symptomatic treatment and worrisome reasons to return to the department. Reinforced importance of self-isolation pending Covid results. PCP follow-up     ED Course as of 12/30/21 0426   Thu Dec 30, 2021   0120 Patient updated on results and plan of care [LP]   [phone] Radiology called regarding CT results [LP]      ED Course User Index  [LP] Maynor Lackey NP       Provider Notes (Medical Decision Making):     MDM  Number of Diagnoses or Management Options  Atypical chest pain: new, needed workup  Constipation, unspecified constipation type: new, no workup  Exposure to confirmed case of COVID-19: minor  Left breast mass: new, no workup  Upper respiratory tract infection, unspecified type: new, needed workup     Amount and/or Complexity of Data Reviewed  Clinical lab tests: ordered and reviewed  Tests in the radiology section of CPT®: ordered and reviewed  Review and summarize past medical records: yes    Risk of Complications, Morbidity, and/or Mortality  Presenting problems: moderate  Diagnostic procedures: moderate  Management options: moderate    Patient Progress  Patient progress: stable           Disposition   Disposition: Condition stable  DC- Adult Discharges: All of the diagnostic tests were reviewed and questions answered. Diagnosis, care plan and treatment options were discussed. The patient understands the instructions and will follow up as directed.  The patients results have been reviewed with them. They have been counseled regarding their diagnosis. The patient verbally convey understanding and agreement of the signs, symptoms, diagnosis, treatment and prognosis and additionally agrees to follow up as recommended with their PCP in 24 - 48 hours. They also agree with the care-plan and convey that all of their questions have been answered. I have also put together some discharge instructions for them that include: 1) educational information regarding their diagnosis, 2) how to care for their diagnosis at home, as well a 3) list of reasons why they would want to return to the ED prior to their follow-up appointment, should their condition change. DC-The patient was given verbal fever treatment  and follow-up instructions  DC- Pain Control DC Home plan: Nonsteroidals, Tylenol and Referral Family Medicine/PCP and GYN        DISCHARGE PLAN:  1. Current Discharge Medication List      CONTINUE these medications which have NOT CHANGED    Details   gabapentin (NEURONTIN) 300 mg capsule       oxyCODONE-acetaminophen (PERCOCET) 5-325 mg per tablet       polyethylene glycol (MIRALAX) 17 gram packet Take 1 Packet by mouth daily. Use as needed to help treat post operative constipation. 1-2 packets mixed with liquid daily until stools become soft and regular and then you may wean off or continue as needed. Qty: 14 Packet, Refills: 1      rizatriptan (MAXALT) 5 mg tablet Take 5 mg by mouth once as needed for Migraine. May repeat in 2 hours if needed      butalbital-acetaminophen-caffeine (FIORICET, ESGIC) -40 mg per tablet Take 1 Tab by mouth every four (4) hours as needed for Headache.      multivitamin (ONE A DAY) tablet Take 1 Tab by mouth nightly. B.infantis-B.ani-B.long-B.bifi (Probiotic 4X) 10-15 mg TbEC Take  by mouth.       ondansetron hcl (Zofran) 4 mg tablet Take 4 mg by mouth every eight (8) hours as needed for Nausea or Vomiting.      hyoscyamine SL (Levsin/SL) 0.125 mg SL tablet 1 Tab by SubLINGual route every four (4) hours as needed for Cramping. Qty: 30 Tab, Refills: 0      sucralfate (CARAFATE) 1 gram tablet Take 1 g by mouth four (4) times daily. pantoprazole (PROTONIX) 40 mg tablet Take 40 mg by mouth two (2) times a day. Staarted 12/14/2020 2 tablets a day for 1 week then take 1 tablet a day       insulin pump (PATIENT SUPPLIED) misc Resume prior to admission settings/recommendations  Qty: 1 Each, Refills: 0      traZODone (DESYREL) 50 mg tablet Take 75 mg by mouth nightly. FLUoxetine (PROzac) 20 mg tablet Take 20 mg by mouth daily. topiramate (Topamax) 100 mg tablet Take  by mouth two (2) times a day. cholecalciferol (VITAMIN D3) (50,000 UNITS /1250 MCG) capsule Take  by mouth every seven (7) days. Every friday           2. Follow-up Information     Follow up With Specialties Details Why Contact Info    Casper Juarez MD Family Medicine Schedule an appointment as soon as possible for a visit in 2 days for ER follow up [facility]  [phone]      Randy Valencia MD Obstetrics & Gynecology Schedule an appointment as soon as possible for a visit  OR your GYN for breast mass follow-up [address] [facility] [address] [facility]  [phone]          3. Return to ED if worse   4. Current Discharge Medication List            Diagnosis     Clinical Impression:   1. Upper respiratory tract infection, unspecified type    2. Exposure to confirmed case of COVID-19    3. Atypical chest pain    4. Left breast mass    5. Constipation, unspecified constipation type        Attestations:    Nena Goodwin NP    Please note that this dictation was completed with [employer], the [employer] voice recognition software. Quite often unanticipated grammatical, syntax, homophones, and other interpretive errors are inadvertently transcribed by the computer software. Please disregard these errors.   Please excuse any errors that have escaped final proofreading. Thank you.

## 2021-12-30 NOTE — DISCHARGE INSTRUCTIONS
Thank you! Thank you for allowing me to care for you in the emergency department. I sincerely hope that you are satisfied with your visit today. It is my goal to provide you with excellent care. Below you will find a list of your labs and imaging from your visit today. Should you have any questions regarding these results please do not hesitate to call the emergency department. Labs -     Recent Results (from the past 12 hour(s))   CBC WITH AUTOMATED DIFF    Collection Time: 12/29/21 11:41 PM   Result Value Ref Range    WBC 5.6 3.6 - 11.0 K/uL    RBC 3.77 (L) 3.80 - 5.20 M/uL    HGB 9.9 (L) 11.5 - 16.0 g/dL    HCT 31.0 (L) 35.0 - 47.0 %    MCV 82.2 80.0 - 99.0 FL    MCH 26.3 26.0 - 34.0 PG    MCHC 31.9 30.0 - 36.5 g/dL    RDW 15.3 (H) 11.5 - 14.5 %    PLATELET 797 252 - 663 K/uL    MPV 11.1 8.9 - 12.9 FL    NRBC 0.0 0.0  WBC    ABSOLUTE NRBC 0.00 0.00 - 0.01 K/uL    NEUTROPHILS 51 32 - 75 %    LYMPHOCYTES 41 12 - 49 %    MONOCYTES 6 5 - 13 %    EOSINOPHILS 2 0 - 7 %    BASOPHILS 0 0 - 1 %    IMMATURE GRANULOCYTES 0 0 - 0.5 %    ABS. NEUTROPHILS 2.8 1.8 - 8.0 K/UL    ABS. LYMPHOCYTES 2.3 0.8 - 3.5 K/UL    ABS. MONOCYTES 0.3 0.0 - 1.0 K/UL    ABS. EOSINOPHILS 0.1 0.0 - 0.4 K/UL    ABS. BASOPHILS 0.0 0.0 - 0.1 K/UL    ABS. IMM. GRANS. 0.0 0.00 - 0.04 K/UL    DF AUTOMATED     METABOLIC PANEL, COMPREHENSIVE    Collection Time: 12/29/21 11:41 PM   Result Value Ref Range    Sodium 140 136 - 145 mmol/L    Potassium 3.6 3.5 - 5.1 mmol/L    Chloride 111 (H) 97 - 108 mmol/L    CO2 22 21 - 32 mmol/L    Anion gap 7 5 - 15 mmol/L    Glucose 168 (H) 65 - 100 mg/dL    BUN 10 6 - 20 mg/dL    Creatinine 0.53 (L) 0.55 - 1.02 mg/dL    BUN/Creatinine ratio 19 12 - 20      GFR est AA >60 >60 ml/min/1.73m2    GFR est non-AA >60 >60 ml/min/1.73m2    Calcium 8.0 (L) 8.5 - 10.1 mg/dL    Bilirubin, total 0.2 0.2 - 1.0 mg/dL    AST (SGOT) 45 (H) 15 - 37 U/L    ALT (SGPT) 48 12 - 78 U/L    Alk.  phosphatase 86 45 - 117 U/L Protein, total 6.4 6.4 - 8.2 g/dL    Albumin 3.3 (L) 3.5 - 5.0 g/dL    Globulin 3.1 2.0 - 4.0 g/dL    A-G Ratio 1.1 1.1 - 2.2     TROPONIN-HIGH SENSITIVITY    Collection Time: 12/29/21 11:41 PM   Result Value Ref Range    Troponin-High Sensitivity 3 0 - 51 ng/L   D DIMER    Collection Time: 12/29/21 11:41 PM   Result Value Ref Range    D DIMER 1.11 (H) <0.50 ug/ml(FEU)       Radiologic Studies -   CTA CHEST W OR W WO CONT   Final Result      No pulmonary emboli. Density in the left breast appears grossly stable compared to prior study. This   may be part of fibroglandular tissue or other, although mammography is more   sensitive for evaluation of the breasts. Follow-up as clinically indicated. Please see full report. XR CHEST PORT   Final Result      No airspace disease in the lungs. Follow-up as clinically indicated. CT Results  (Last 48 hours)                 12/30/21 0151  CTA CHEST W OR W WO CONT Final result    Impression:      No pulmonary emboli. Density in the left breast appears grossly stable compared to prior study. This   may be part of fibroglandular tissue or other, although mammography is more   sensitive for evaluation of the breasts. Follow-up as clinically indicated. Please see full report. Narrative:  CTA chest with intravenous contrast, 100 cc [id number], 3-D MIP images       Dose reduction: All CT scans at this facility are performed using dose reduction   optimization techniques as appropriate to a performed exam including the   following: Automated exposure control, adjustments of the mA and/or kV according   to patient size, or use of iterative reconstruction technique. INDICATION: Evaluate for pulmonary embolism       COMPARISON: 11/26/2020       FINDINGS:       No pulmonary emboli are identified. No aneurysm or dissection of thoracic aorta. No mediastinal adenopathy. No pleural or pericardial effusions. Postoperative   changes stomach. Cholecystectomy. Moderate amount of stool in the visualized   colon in the upper abdomen. Probable punctate nonobstructive calcification in   the upper pole of left kidney. Approximately 25 mm density inferior to the left   nipple appears grossly stable compared to prior study, although mammography is   more sensitive for evaluation of the breasts. No airspace disease in the lungs. No pneumothorax. No acute fracture in the visualized bony structures. CXR Results  (Last 48 hours)                 12/29/21 2356  XR CHEST PORT Final result    Impression:      No airspace disease in the lungs. Follow-up as clinically indicated. Narrative:  Chest one view       INDICATION: Cough       COMPARISON: 11/26/2020       FINDINGS:       Cardiac silhouette is stable. No jessica pulmonary edema. No airspace disease in   the lungs. No pleural effusion or pneumothorax. No displaced fracture in the   visualized bony structures. If you feel that you have not received excellent quality care or timely care, please ask to speak to the nurse manager. Please choose us in the future for your continued health care needs. ------------------------------------------------------------------------------------------------------------  The exam and treatment you received in the Emergency Department were for an urgent problem and are not intended as complete care. It is important that you follow-up with a doctor, nurse practitioner, or physician assistant to:  (1) confirm your diagnosis,  (2) re-evaluation of changes in your illness and treatment, and  (3) for ongoing care. If your symptoms become worse or you do not improve as expected and you are unable to reach your usual health care provider, you should return to the Emergency Department. We are available 24 hours a day. Please take your discharge instructions with you when you go to your follow-up appointment.      If you have any problem arranging a follow-up appointment, contact the Emergency Department immediately. If a prescription has been provided, please have it filled as soon as possible to prevent a delay in treatment. Read the entire medication instruction sheet provided to you by the pharmacy. If you have any questions or reservations about taking the medication due to side effects or interactions with other medications, please call your primary care physician or contact the ER to speak with the charge nurse. Make an appointment with your family doctor or the physician you were referred to for follow-up of this visit as instructed on your discharge paperwork, as this is a mandatory follow-up. Return to the ER if you are unable to be seen or if you are unable to be seen in a timely manner. If you have any problem arranging the follow-up visit, contact the Emergency Department immediately.

## 2021-12-31 LAB
SARS-COV-2, XPLCVT: NOT DETECTED
SOURCE, COVRS: NORMAL

## 2022-02-08 ENCOUNTER — OFFICE VISIT (OUTPATIENT)
Dept: SURGERY | Age: 42
End: 2022-02-08
Payer: OTHER GOVERNMENT

## 2022-02-08 VITALS
DIASTOLIC BLOOD PRESSURE: 69 MMHG | HEART RATE: 80 BPM | SYSTOLIC BLOOD PRESSURE: 123 MMHG | WEIGHT: 135 LBS | BODY MASS INDEX: 25.49 KG/M2 | HEIGHT: 61 IN

## 2022-02-08 DIAGNOSIS — N63.20 BREAST MASS, LEFT: Primary | ICD-10-CM

## 2022-02-08 PROCEDURE — 99202 OFFICE O/P NEW SF 15 MIN: CPT | Performed by: SURGERY

## 2022-02-08 NOTE — PROGRESS NOTES
HISTORY OF PRESENT ILLNESS  Smith Bacon is a 42 y.o. female. HPI NEW patient referral for consultation by Dr. Tabby Joe at [facility] for a LEFT breast lump seen on a CT scan. The patient was ill and had difficulty breathing. She had CT pulmonary angiogram for SOB, and was reported to have a LEFT retroareolar abnormality. She can feel a mass. Follow up mammogram showed dense breast tissue. Ultrasound was negative, BIRADS 2. She denies any nipple inversion or discharge. No family hx of breast or ovarian cancer. Father  of colon cancer  Mammogram - done at [facility] 22 BI RADS 2   Fatty replaced breast tissue. Dense area subareolar, ultrasound normal.  Past Medical History:   Diagnosis Date    Anxiety     Autoimmune disease ([address]     Bloating     Depression     AND ANXIETY    Diabetes [address] [address]     Type I     Diarrhea     DKA (diabetic ketoacidoses) 2020    DKA (diabetic ketoacidoses) 2020    POST SHOULDER SURGERY-AND CLOTS IN RIGHT FOREARM    DM (diabetes mellitus), type 1 ([address] 2020    Fatigue     Headache     Kidney stones     Loss of appetite     Migraine 2020    Nausea     Nausea & vomiting     PUD (peptic ulcer disease)     X3     Past Surgical History:   Procedure Laterality Date    HX ABDOMINAL LAPAROSCOPY  2021    Diagnostic laparoscopy repair of mesentery defecit by Dr. Zarina Fields.  HX CHOLECYSTECTOMY      Dr. Dennis Topete      Dr. 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       Obstetric Comments   Menarche 14, LMP 20, # of children 3, age of 4st delivery 15, Hysterectomy/oophorectomy no/no, Breast bx none, history of breast feeding no, BCP yes, Hormone therapy none           Family History   Problem Relation Age of Onset    Hypertension Mother     Heart Disease Mother         VALVE ISSUE    Cancer Father         COLON    Hypertension Father     No Known Problems Brother     No Known Problems Brother     Heart Disease Brother     Psychiatric Disorder Brother         DEPRESSION    Anesth Problems Brother         \"SUPER ANGRY\"    No Known Problems Son     No Known Problems Son     Other Daughter         BORN WITHOUT A UTERUS     Social History     Tobacco Use    Smoking status: Never Smoker    Smokeless tobacco: Never Used   Substance Use Topics    Alcohol use: Not Currently      Prior to Admission medications    Medication Sig Start Date End Date Taking? Authorizing Provider   rizatriptan (MAXALT) 5 mg tablet Take 5 mg by mouth once as needed for Migraine. May repeat in 2 hours if needed   Yes Provider, Historical   butalbital-acetaminophen-caffeine (FIORICET, ESGIC) -40 mg per tablet Take 1 Tab by mouth every four (4) hours as needed for Headache. Yes Provider, Historical   multivitamin (ONE A DAY) tablet Take 1 Tab by mouth nightly. Yes Provider, Historical   B.infantis-B.ani-B.long-B.bifi (Probiotic 4X) 10-15 mg TbEC Take  by mouth. Yes Provider, Historical   ondansetron hcl (Zofran) 4 mg tablet Take 4 mg by mouth every eight (8) hours as needed for Nausea or Vomiting. Yes Provider, Historical   sucralfate (CARAFATE) 1 gram tablet Take 1 g by mouth four (4) times daily. Yes Provider, Historical   insulin pump (PATIENT SUPPLIED) misc Resume prior to admission settings/recommendations  Patient taking differently: Resume prior to admission settings/recommendations  30-40 units daily 11/28/20  Yes Rehan Lai MD   traZODone (DESYREL) 50 mg tablet Take 75 mg by mouth nightly.    Yes Provider, Historical   FLUoxetine (PROzac) 20 mg tablet Take 20 mg by mouth daily. Yes Provider, Historical   topiramate (Topamax) 100 mg tablet Take  by mouth two (2) times a day. Yes Provider, Historical   cholecalciferol (VITAMIN D3) (50,000 UNITS /1250 MCG) capsule Take  by mouth every seven (7) days. Every friday   Yes Provider, Historical   gabapentin (NEURONTIN) 300 mg capsule  6/8/21   Provider, Historical   oxyCODONE-acetaminophen (PERCOCET) 5-325 mg per tablet  5/24/21   Provider, Historical   polyethylene glycol (MIRALAX) 17 gram packet Take 1 Packet by mouth daily. Use as needed to help treat post operative constipation. 1-2 packets mixed with liquid daily until stools become soft and regular and then you may wean off or continue as needed. 2/4/21   Woo Rossi MD   hyoscyamine SL (Levsin/SL) 0.125 mg SL tablet 1 Tab by SubLINGual route every four (4) hours as needed for Cramping. 1/8/21   Christianne Moody NP   pantoprazole (PROTONIX) 40 mg tablet Take 40 mg by mouth two (2) times a day. [place] 12/14/2020 2 tablets a day for 1 week then take 1 tablet a day     Provider, Historical      Allergies   Allergen Reactions    Adhesive Tape-Silicones Contact Dermatitis     Erythema     Chlorhexidine Rash    Other Medication Rash     dermabond. Review of Systems   Constitutional: Negative. HENT: Negative. Eyes: Negative. Respiratory: Negative. Cardiovascular: Negative. Gastrointestinal: Negative. Genitourinary: Negative. Musculoskeletal: Negative. Skin: Negative. Neurological: Negative. Endo/Heme/Allergies: Negative. Psychiatric/Behavioral: Positive for depression. The patient is nervous/anxious and has insomnia. Physical Exam  Constitutional:       Appearance: She is well-developed. Cardiovascular:      Rate and Rhythm: Normal rate and regular rhythm. Heart sounds: Normal heart sounds. Pulmonary:      Effort: Pulmonary effort is normal.      Breath sounds: Normal breath sounds.    Chest:   Breasts: Breasts are symmetrical.      Right: No swelling, mass, nipple discharge, skin change, tenderness, axillary adenopathy or supraclavicular adenopathy. Left: Mass (smooth flat oval mobile mass 4:00 periareolar) present. No swelling, nipple discharge, skin change, tenderness, axillary adenopathy or supraclavicular adenopathy. Lymphadenopathy:      Upper Body:      Right upper body: No supraclavicular or axillary adenopathy. Left upper body: No supraclavicular or axillary adenopathy. Skin:     General: Skin is warm and dry. Neurological:      Mental Status: She is alert and oriented to person, place, and time. ASSESSMENT and PLAN    ICD-10-CM ICD-9-CM    1. Breast mass, left  N63.20 611.72      Total time spent with patient: 20 minutes     1. LEFT breast mass is normal subareolar dense breast tissue. No worrisome finding on mammogram or ultrasound  2. continue annual mammograms  3.  Pt was reassured that her mammograms are \"fatty replaced\" and not dense as she thought she had been told in the past

## 2022-02-08 NOTE — PATIENT INSTRUCTIONS
Breast Lumps: Care Instructions  Your Care Instructions  Breast lumps are common, especially in women between ages 27 and 48. Many women's breasts feel lumpy and tender before their menstrual period. Women also may have lumps when they are breastfeeding. Breast lumps may go away after menopause. All new breast lumps in women after menopause should be checked by a doctor. Although lumps may be normal for you, it is important to have your doctor check any lump or thickness that is not like the rest of your breast to make sure it is not cancer. A lump may be larger, harder, or different from the rest of your breast tissue. Follow-up care is a key part of your treatment and safety. Be sure to make and go to all appointments, and call your doctor if you are having problems. It's also a good idea to know your test results and keep a list of the medicines you take. How can you care for yourself at home? · Make an appointment to have a mammogram and other follow-up visits as recommended by your doctor. When should you call for help? Watch closely for changes in your health, and be sure to contact your doctor if:    · You do not get better as expected.     · Your breast has changed.     · You have pain in your breast.     · You have a discharge from your nipple.     · A breast lump changes or does not go away. Where can you learn more? Go to http://www.gray.com/  Enter Q928 in the search box to learn more about \"Breast Lumps: Care Instructions. \"  Current as of: February 11, 2021               Content Version: 13.0  © 2006-2021 [employer]. Care instructions adapted under license by [employer]. (which disclaims liability or warranty for this information). If you have questions about a medical condition or this instruction, always ask your healthcare professional. [address] any warranty or liability for your use of this information.

## 2022-03-18 PROBLEM — F41.9 ANXIETY: Status: ACTIVE | Noted: 2020-11-26

## 2022-03-18 PROBLEM — E10.9 DM (DIABETES MELLITUS), TYPE 1 (HCC): Status: ACTIVE | Noted: 2020-11-26

## 2022-03-19 PROBLEM — M25.519 SHOULDER PAIN: Status: ACTIVE | Noted: 2020-11-27

## 2022-03-19 PROBLEM — K45.8 MESENTERIC HERNIA: Status: ACTIVE | Noted: 2021-02-04

## 2022-03-19 PROBLEM — G43.909 MIGRAINE: Status: ACTIVE | Noted: 2020-11-26

## 2022-03-20 PROBLEM — R10.9 ABDOMINAL PAIN: Status: ACTIVE | Noted: 2021-02-04

## 2022-03-20 PROBLEM — E55.9 VITAMIN D DEFICIENCY: Status: ACTIVE | Noted: 2020-11-26

## 2023-05-15 RX ORDER — BUTALBITAL, ACETAMINOPHEN AND CAFFEINE 50; 325; 40 MG/1; MG/1; MG/1
1 TABLET ORAL EVERY 4 HOURS PRN
COMMUNITY

## 2023-05-15 RX ORDER — SUCRALFATE 1 G/1
1 TABLET ORAL 4 TIMES DAILY
COMMUNITY

## 2023-05-15 RX ORDER — FLUOXETINE 20 MG/1
20 TABLET ORAL DAILY
COMMUNITY

## 2023-05-15 RX ORDER — TRAZODONE HYDROCHLORIDE 50 MG/1
75 TABLET ORAL NIGHTLY
COMMUNITY

## 2023-05-15 RX ORDER — TOPIRAMATE 100 MG/1
TABLET, FILM COATED ORAL 2 TIMES DAILY
COMMUNITY

## 2023-05-15 RX ORDER — ONDANSETRON 4 MG/1
4 TABLET, FILM COATED ORAL EVERY 8 HOURS PRN
COMMUNITY

## 2023-05-15 RX ORDER — RIZATRIPTAN BENZOATE 5 MG/1
5 TABLET ORAL
COMMUNITY

## 2023-05-15 RX ORDER — PANTOPRAZOLE SODIUM 40 MG/1
40 TABLET, DELAYED RELEASE ORAL 2 TIMES DAILY
COMMUNITY

## 2025-03-18 NOTE — TELEPHONE ENCOUNTER
"                                                           [facility] Social Work Progress Notes     Sw met with patient and completed IMM and IRF. IMM signed and placed in physical chart. Spouse was at bedside and he would be providing transportation at discharge. Patient understands she would be doing OP therapies. No questions or concerns at the moment.     IRF-ANUP Pain Assessment    Pain Effect on Sleep  \"Over the past 5 days, how much of the time has pain made it hard for you to sleep at night?\"    0. Does not apply - I have not had any pain or hurting in the past 5 days     Pain Interference with Therapy Activities  \"Over the past 5 days, how often have you limited your participation in rehabilitation therapy sessions due to pain?\"   0. Does not apply - I have not had any pain or hurting in the past 5 days        SIOMARA Lazaro  [facility], Acute Inpatient Rehab Unit   [address] [place] [address], [address] [place] [address]  Phone: [phone]  Fax: [phone]    " Spoke with Home additional office note and documentation needed for referral that is in place and pending for EGD scheduled 12/28/20. Rep Alli LOZADA Transcribed office note verbally via phone notes also faxed to home at number provided [phone] and [phone] confirmations received for both. Per rep review should not take long once sent over. He stated we should receive confirmation via fax. Informed him I will follow up on status by noon.   Patient informed

## (undated) DEVICE — VISUALIZATION SYSTEM: Brand: CLEARIFY

## (undated) DEVICE — TUBING, SUCTION, 1/4" X 12', STRAIGHT: Brand: MEDLINE

## (undated) DEVICE — 4-PORT MANIFOLD: Brand: NEPTUNE 2

## (undated) DEVICE — Device

## (undated) DEVICE — SURGICAL PROCEDURE KIT GEN LAPAROSCOPY LF

## (undated) DEVICE — REM POLYHESIVE ADULT PATIENT RETURN ELECTRODE: Brand: VALLEYLAB

## (undated) DEVICE — TROCAR: Brand: KII® SLEEVE

## (undated) DEVICE — GARMENT,MEDLINE,DVT,INT,CALF,MED, GEN2: Brand: MEDLINE

## (undated) DEVICE — STERILE POLYISOPRENE POWDER-FREE SURGICAL GLOVES WITH EMOLLIENT COATING: Brand: PROTEXIS

## (undated) DEVICE — DERMABOND SKIN ADH 0.7ML -- DERMABOND ADVANCED 12/BX

## (undated) DEVICE — STRAP,POSITIONING,KNEE/BODY,FOAM,4X60": Brand: MEDLINE

## (undated) DEVICE — INFECTION CONTROL KIT SYS

## (undated) DEVICE — NEEDLE SPNL 22GA L3.5IN BLK HUB S STL REG WALL FIT STYL W/

## (undated) DEVICE — FILTER SMK EVAC FLO CLR MEGADYNE

## (undated) DEVICE — SCISSORS ENDOSCP DIA5MM CRV MPLR CAUT W/ RATCH HNDL

## (undated) DEVICE — DRAPE,UTILTY,TAPE,15X26, 4EA/PK: Brand: MEDLINE

## (undated) DEVICE — SUTURE MCRYL SZ 4-0 L27IN ABSRB UD L19MM PS-2 1/2 CIR PRIM Y426H

## (undated) DEVICE — PREP SKN CHLRAPRP APL 26ML STR --

## (undated) DEVICE — LAPAROSCOPIC TROCAR SLEEVE/SINGLE USE: Brand: KII® OPTICAL ACCESS SYSTEM